# Patient Record
Sex: FEMALE | Race: WHITE | NOT HISPANIC OR LATINO | Employment: OTHER | URBAN - METROPOLITAN AREA
[De-identification: names, ages, dates, MRNs, and addresses within clinical notes are randomized per-mention and may not be internally consistent; named-entity substitution may affect disease eponyms.]

---

## 2019-11-26 ENCOUNTER — APPOINTMENT (OUTPATIENT)
Dept: GENERAL RADIOLOGY | Facility: HOSPITAL | Age: 84
End: 2019-11-26

## 2019-11-26 ENCOUNTER — APPOINTMENT (OUTPATIENT)
Dept: CT IMAGING | Facility: HOSPITAL | Age: 84
End: 2019-11-26

## 2019-11-26 ENCOUNTER — HOSPITAL ENCOUNTER (INPATIENT)
Facility: HOSPITAL | Age: 84
LOS: 4 days | Discharge: NURSING, REHAB OR LONG-TERM CARE | End: 2019-12-01
Attending: EMERGENCY MEDICINE | Admitting: INTERNAL MEDICINE

## 2019-11-26 ENCOUNTER — HOSPITAL ENCOUNTER (OUTPATIENT)
Dept: CARDIOLOGY | Facility: HOSPITAL | Age: 84
Discharge: HOME | End: 2019-11-26

## 2019-11-26 VITALS
BODY MASS INDEX: 23.19 KG/M2 | HEIGHT: 62 IN | SYSTOLIC BLOOD PRESSURE: 115 MMHG | WEIGHT: 126 LBS | DIASTOLIC BLOOD PRESSURE: 69 MMHG

## 2019-11-26 DIAGNOSIS — R77.8 ELEVATED TROPONIN: ICD-10-CM

## 2019-11-26 DIAGNOSIS — I95.9 HYPOTENSION, UNSPECIFIED HYPOTENSION TYPE: ICD-10-CM

## 2019-11-26 DIAGNOSIS — I48.91 NEW ONSET ATRIAL FIBRILLATION (HCC): Primary | ICD-10-CM

## 2019-11-26 LAB
ALBUMIN SERPL-MCNC: 3.6 G/DL (ref 3.5–5.2)
ALBUMIN/GLOB SERPL: 1.2 G/DL
ALP SERPL-CCNC: 46 U/L (ref 39–117)
ALT SERPL W P-5'-P-CCNC: 13 U/L (ref 1–33)
ANION GAP SERPL CALCULATED.3IONS-SCNC: 13 MMOL/L (ref 5–15)
ANION GAP SERPL CALCULATED.3IONS-SCNC: 17 MMOL/L (ref 5–15)
AST SERPL-CCNC: 28 U/L (ref 1–32)
BACTERIA UR QL AUTO: ABNORMAL /HPF
BASOPHILS # BLD AUTO: 0 10*3/MM3 (ref 0–0.2)
BASOPHILS NFR BLD AUTO: 0.5 % (ref 0–1.5)
BH CV ECHO MEAS - % IVS THICK: 31.2 %
BH CV ECHO MEAS - % LVPW THICK: 13.9 %
BH CV ECHO MEAS - ACS: 1.6 CM
BH CV ECHO MEAS - AI DEC SLOPE: 286.3 CM/SEC^2
BH CV ECHO MEAS - AI DEC TIME: 1.2 SEC
BH CV ECHO MEAS - AI MAX PG: 47.1 MMHG
BH CV ECHO MEAS - AI MAX VEL: 343.2 CM/SEC
BH CV ECHO MEAS - AI P1/2T: 351.1 MSEC
BH CV ECHO MEAS - AO MAX PG (FULL): 4.8 MMHG
BH CV ECHO MEAS - AO MAX PG: 9.1 MMHG
BH CV ECHO MEAS - AO MEAN PG (FULL): 3.3 MMHG
BH CV ECHO MEAS - AO MEAN PG: 5.6 MMHG
BH CV ECHO MEAS - AO ROOT AREA (BSA CORRECTED): 1.8
BH CV ECHO MEAS - AO ROOT AREA: 6.4 CM^2
BH CV ECHO MEAS - AO ROOT DIAM: 2.9 CM
BH CV ECHO MEAS - AO V2 MAX: 150.6 CM/SEC
BH CV ECHO MEAS - AO V2 MEAN: 112.4 CM/SEC
BH CV ECHO MEAS - AO V2 VTI: 26.1 CM
BH CV ECHO MEAS - ASC AORTA: 3.3 CM
BH CV ECHO MEAS - AVA(I,A): 1.8 CM^2
BH CV ECHO MEAS - AVA(I,D): 1.8 CM^2
BH CV ECHO MEAS - AVA(V,A): 2 CM^2
BH CV ECHO MEAS - AVA(V,D): 2 CM^2
BH CV ECHO MEAS - BSA(HAYCOCK): 1.6 M^2
BH CV ECHO MEAS - BSA: 1.6 M^2
BH CV ECHO MEAS - BZI_BMI: 23 KILOGRAMS/M^2
BH CV ECHO MEAS - BZI_METRIC_HEIGHT: 157.5 CM
BH CV ECHO MEAS - BZI_METRIC_WEIGHT: 57.2 KG
BH CV ECHO MEAS - EDV(CUBED): 44 ML
BH CV ECHO MEAS - EDV(MOD-SP2): 36 ML
BH CV ECHO MEAS - EDV(MOD-SP4): 37 ML
BH CV ECHO MEAS - EDV(TEICH): 51.9 ML
BH CV ECHO MEAS - EF(CUBED): 79.6 %
BH CV ECHO MEAS - EF(MOD-BP): 62 %
BH CV ECHO MEAS - EF(MOD-SP2): 60.2 %
BH CV ECHO MEAS - EF(MOD-SP4): 63.4 %
BH CV ECHO MEAS - EF(TEICH): 73 %
BH CV ECHO MEAS - ESV(CUBED): 9 ML
BH CV ECHO MEAS - ESV(MOD-SP2): 14.3 ML
BH CV ECHO MEAS - ESV(MOD-SP4): 13.6 ML
BH CV ECHO MEAS - ESV(TEICH): 14 ML
BH CV ECHO MEAS - FS: 41.1 %
BH CV ECHO MEAS - IVS/LVPW: 1
BH CV ECHO MEAS - IVSD: 0.99 CM
BH CV ECHO MEAS - IVSS: 1.3 CM
BH CV ECHO MEAS - LA DIMENSION(2D): 3.1 CM
BH CV ECHO MEAS - LV DIASTOLIC VOL/BSA (35-75): 23.6 ML/M^2
BH CV ECHO MEAS - LV MASS(C)D: 100.8 GRAMS
BH CV ECHO MEAS - LV MASS(C)DI: 64.2 GRAMS/M^2
BH CV ECHO MEAS - LV MASS(C)S: 67.4 GRAMS
BH CV ECHO MEAS - LV MASS(C)SI: 42.9 GRAMS/M^2
BH CV ECHO MEAS - LV MAX PG: 4.3 MMHG
BH CV ECHO MEAS - LV MEAN PG: 2.3 MMHG
BH CV ECHO MEAS - LV SYSTOLIC VOL/BSA (12-30): 8.6 ML/M^2
BH CV ECHO MEAS - LV V1 MAX: 103.4 CM/SEC
BH CV ECHO MEAS - LV V1 MEAN: 70.2 CM/SEC
BH CV ECHO MEAS - LV V1 VTI: 16.3 CM
BH CV ECHO MEAS - LVIDD: 3.5 CM
BH CV ECHO MEAS - LVIDS: 2.1 CM
BH CV ECHO MEAS - LVOT AREA: 2.9 CM^2
BH CV ECHO MEAS - LVOT DIAM: 1.9 CM
BH CV ECHO MEAS - LVPWD: 0.96 CM
BH CV ECHO MEAS - LVPWS: 1.1 CM
BH CV ECHO MEAS - MV A MAX VEL: 112.8 CM/SEC
BH CV ECHO MEAS - MV DEC SLOPE: 579.1 CM/SEC^2
BH CV ECHO MEAS - MV DEC TIME: 0.18 SEC
BH CV ECHO MEAS - MV E MAX VEL: 55.9 CM/SEC
BH CV ECHO MEAS - MV E/A: 0.5
BH CV ECHO MEAS - MV MAX PG: 7.4 MMHG
BH CV ECHO MEAS - MV MEAN PG: 4.4 MMHG
BH CV ECHO MEAS - MV V2 MAX: 136.3 CM/SEC
BH CV ECHO MEAS - MV V2 MEAN: 101.5 CM/SEC
BH CV ECHO MEAS - MV V2 VTI: 27.7 CM
BH CV ECHO MEAS - MVA(VTI): 1.7 CM^2
BH CV ECHO MEAS - PA ACC TIME: 0.06 SEC
BH CV ECHO MEAS - PA MAX PG (FULL): 0.36 MMHG
BH CV ECHO MEAS - PA MAX PG: 4.1 MMHG
BH CV ECHO MEAS - PA MEAN PG (FULL): 0.48 MMHG
BH CV ECHO MEAS - PA MEAN PG: 1.9 MMHG
BH CV ECHO MEAS - PA PR(ACCEL): 51 MMHG
BH CV ECHO MEAS - PA V2 MAX: 101 CM/SEC
BH CV ECHO MEAS - PA V2 MEAN: 64.2 CM/SEC
BH CV ECHO MEAS - PA V2 VTI: 16.7 CM
BH CV ECHO MEAS - PVA(I,A): 2.1 CM^2
BH CV ECHO MEAS - PVA(I,D): 2.1 CM^2
BH CV ECHO MEAS - PVA(V,A): 2.5 CM^2
BH CV ECHO MEAS - PVA(V,D): 2.5 CM^2
BH CV ECHO MEAS - QP/QS: 0.74
BH CV ECHO MEAS - RAP SYSTOLE: 8 MMHG
BH CV ECHO MEAS - RV MAX PG: 3.7 MMHG
BH CV ECHO MEAS - RV MEAN PG: 1.4 MMHG
BH CV ECHO MEAS - RV V1 MAX: 96.4 CM/SEC
BH CV ECHO MEAS - RV V1 MEAN: 54.6 CM/SEC
BH CV ECHO MEAS - RV V1 VTI: 13.8 CM
BH CV ECHO MEAS - RVDD: 2.3 CM
BH CV ECHO MEAS - RVOT AREA: 2.6 CM^2
BH CV ECHO MEAS - RVOT DIAM: 1.8 CM
BH CV ECHO MEAS - RVSP: 46.7 MMHG
BH CV ECHO MEAS - SI(AO): 106.8 ML/M^2
BH CV ECHO MEAS - SI(CUBED): 22.3 ML/M^2
BH CV ECHO MEAS - SI(LVOT): 30.6 ML/M^2
BH CV ECHO MEAS - SI(MOD-SP2): 13.8 ML/M^2
BH CV ECHO MEAS - SI(MOD-SP4): 15 ML/M^2
BH CV ECHO MEAS - SI(TEICH): 24.1 ML/M^2
BH CV ECHO MEAS - SV(AO): 167.8 ML
BH CV ECHO MEAS - SV(CUBED): 35 ML
BH CV ECHO MEAS - SV(LVOT): 48 ML
BH CV ECHO MEAS - SV(MOD-SP2): 21.7 ML
BH CV ECHO MEAS - SV(MOD-SP4): 23.5 ML
BH CV ECHO MEAS - SV(RVOT): 35.6 ML
BH CV ECHO MEAS - SV(TEICH): 37.9 ML
BH CV ECHO MEAS - TR MAX VEL: 310.9 CM/SEC
BILIRUB SERPL-MCNC: 0.6 MG/DL (ref 0.2–1.2)
BILIRUB UR QL STRIP: NEGATIVE
BUN BLD-MCNC: 19 MG/DL (ref 8–23)
BUN BLD-MCNC: 21 MG/DL (ref 8–23)
BUN/CREAT SERPL: 22.3 (ref 7–25)
BUN/CREAT SERPL: 25.3 (ref 7–25)
CALCIUM SPEC-SCNC: 8.1 MG/DL (ref 8.2–9.6)
CALCIUM SPEC-SCNC: 8.6 MG/DL (ref 8.2–9.6)
CHLORIDE SERPL-SCNC: 88 MMOL/L (ref 98–107)
CHLORIDE SERPL-SCNC: 94 MMOL/L (ref 98–107)
CK SERPL-CCNC: 198 U/L (ref 20–180)
CLARITY UR: CLEAR
CO2 SERPL-SCNC: 25 MMOL/L (ref 22–29)
CO2 SERPL-SCNC: 26 MMOL/L (ref 22–29)
COLOR UR: YELLOW
CREAT BLD-MCNC: 0.75 MG/DL (ref 0.57–1)
CREAT BLD-MCNC: 0.94 MG/DL (ref 0.57–1)
D-LACTATE SERPL-SCNC: 2.3 MMOL/L (ref 0.5–2)
D-LACTATE SERPL-SCNC: 3.2 MMOL/L (ref 0.5–2)
DEPRECATED RDW RBC AUTO: 44.6 FL (ref 37–54)
EOSINOPHIL # BLD AUTO: 0.1 10*3/MM3 (ref 0–0.4)
EOSINOPHIL NFR BLD AUTO: 0.6 % (ref 0.3–6.2)
ERYTHROCYTE [DISTWIDTH] IN BLOOD BY AUTOMATED COUNT: 13.8 % (ref 12.3–15.4)
GFR SERPL CREATININE-BSD FRML MDRD: 55 ML/MIN/1.73
GFR SERPL CREATININE-BSD FRML MDRD: 72 ML/MIN/1.73
GLOBULIN UR ELPH-MCNC: 3.1 GM/DL
GLUCOSE BLD-MCNC: 134 MG/DL (ref 65–99)
GLUCOSE BLD-MCNC: 191 MG/DL (ref 65–99)
GLUCOSE UR STRIP-MCNC: NEGATIVE MG/DL
HCT VFR BLD AUTO: 39.6 % (ref 34–46.6)
HGB BLD-MCNC: 13.8 G/DL (ref 12–15.9)
HGB UR QL STRIP.AUTO: ABNORMAL
HOLD SPECIMEN: NORMAL
HOLD SPECIMEN: NORMAL
HYALINE CASTS UR QL AUTO: ABNORMAL /LPF
KETONES UR QL STRIP: ABNORMAL
LEUKOCYTE ESTERASE UR QL STRIP.AUTO: NEGATIVE
LV EF 2D ECHO EST: 60 %
LYMPHOCYTES # BLD AUTO: 0.7 10*3/MM3 (ref 0.7–3.1)
LYMPHOCYTES NFR BLD AUTO: 6.3 % (ref 19.6–45.3)
MAXIMAL PREDICTED HEART RATE: 126 BPM
MCH RBC QN AUTO: 32.3 PG (ref 26.6–33)
MCHC RBC AUTO-ENTMCNC: 34.9 G/DL (ref 31.5–35.7)
MCV RBC AUTO: 92.4 FL (ref 79–97)
MONOCYTES # BLD AUTO: 0.8 10*3/MM3 (ref 0.1–0.9)
MONOCYTES NFR BLD AUTO: 7.6 % (ref 5–12)
NEUTROPHILS # BLD AUTO: 9 10*3/MM3 (ref 1.7–7)
NEUTROPHILS NFR BLD AUTO: 85 % (ref 42.7–76)
NITRITE UR QL STRIP: NEGATIVE
NRBC BLD AUTO-RTO: 0.1 /100 WBC (ref 0–0.2)
PH UR STRIP.AUTO: 7.5 [PH] (ref 5–8)
PLATELET # BLD AUTO: 318 10*3/MM3 (ref 140–450)
PMV BLD AUTO: 7.2 FL (ref 6–12)
POTASSIUM BLD-SCNC: 2.9 MMOL/L (ref 3.5–5.2)
POTASSIUM BLD-SCNC: 3.3 MMOL/L (ref 3.5–5.2)
PROT SERPL-MCNC: 6.7 G/DL (ref 6–8.5)
PROT UR QL STRIP: ABNORMAL
RBC # BLD AUTO: 4.28 10*6/MM3 (ref 3.77–5.28)
RBC # UR: ABNORMAL /HPF
REF LAB TEST METHOD: ABNORMAL
SODIUM BLD-SCNC: 130 MMOL/L (ref 136–145)
SODIUM BLD-SCNC: 133 MMOL/L (ref 136–145)
SP GR UR STRIP: 1.01 (ref 1–1.03)
SQUAMOUS #/AREA URNS HPF: ABNORMAL /HPF
STRESS TARGET HR: 107 BPM
TROPONIN T SERPL-MCNC: 0.04 NG/ML (ref 0–0.03)
TROPONIN T SERPL-MCNC: 0.77 NG/ML (ref 0–0.03)
TROPONIN T SERPL-MCNC: 0.84 NG/ML (ref 0–0.03)
TSH SERPL DL<=0.05 MIU/L-ACNC: 1.59 UIU/ML (ref 0.27–4.2)
UROBILINOGEN UR QL STRIP: ABNORMAL
WBC NRBC COR # BLD: 10.5 10*3/MM3 (ref 3.4–10.8)
WBC UR QL AUTO: ABNORMAL /HPF

## 2019-11-26 PROCEDURE — G0378 HOSPITAL OBSERVATION PER HR: HCPCS

## 2019-11-26 PROCEDURE — 82550 ASSAY OF CK (CPK): CPT | Performed by: EMERGENCY MEDICINE

## 2019-11-26 PROCEDURE — 25010000002 ENOXAPARIN PER 10 MG: Performed by: INTERNAL MEDICINE

## 2019-11-26 PROCEDURE — 84484 ASSAY OF TROPONIN QUANT: CPT | Performed by: EMERGENCY MEDICINE

## 2019-11-26 PROCEDURE — 99284 EMERGENCY DEPT VISIT MOD MDM: CPT

## 2019-11-26 PROCEDURE — 80053 COMPREHEN METABOLIC PANEL: CPT | Performed by: EMERGENCY MEDICINE

## 2019-11-26 PROCEDURE — 70450 CT HEAD/BRAIN W/O DYE: CPT

## 2019-11-26 PROCEDURE — 81001 URINALYSIS AUTO W/SCOPE: CPT | Performed by: EMERGENCY MEDICINE

## 2019-11-26 PROCEDURE — 93005 ELECTROCARDIOGRAM TRACING: CPT | Performed by: INTERNAL MEDICINE

## 2019-11-26 PROCEDURE — 72125 CT NECK SPINE W/O DYE: CPT

## 2019-11-26 PROCEDURE — 84443 ASSAY THYROID STIM HORMONE: CPT | Performed by: INTERNAL MEDICINE

## 2019-11-26 PROCEDURE — 93005 ELECTROCARDIOGRAM TRACING: CPT | Performed by: EMERGENCY MEDICINE

## 2019-11-26 PROCEDURE — P9612 CATHETERIZE FOR URINE SPEC: HCPCS

## 2019-11-26 PROCEDURE — 84484 ASSAY OF TROPONIN QUANT: CPT | Performed by: INTERNAL MEDICINE

## 2019-11-26 PROCEDURE — 83605 ASSAY OF LACTIC ACID: CPT

## 2019-11-26 PROCEDURE — 93306 TTE W/DOPPLER COMPLETE: CPT | Performed by: INTERNAL MEDICINE

## 2019-11-26 PROCEDURE — 71045 X-RAY EXAM CHEST 1 VIEW: CPT

## 2019-11-26 PROCEDURE — 93306 TTE W/DOPPLER COMPLETE: CPT

## 2019-11-26 PROCEDURE — 99203 OFFICE O/P NEW LOW 30 MIN: CPT | Performed by: INTERNAL MEDICINE

## 2019-11-26 PROCEDURE — 85025 COMPLETE CBC W/AUTO DIFF WBC: CPT | Performed by: EMERGENCY MEDICINE

## 2019-11-26 PROCEDURE — 87040 BLOOD CULTURE FOR BACTERIA: CPT | Performed by: EMERGENCY MEDICINE

## 2019-11-26 PROCEDURE — 92610 EVALUATE SWALLOWING FUNCTION: CPT

## 2019-11-26 RX ORDER — POTASSIUM CHLORIDE 20 MEQ/1
40 TABLET, EXTENDED RELEASE ORAL ONCE
Status: COMPLETED | OUTPATIENT
Start: 2019-11-26 | End: 2019-11-26

## 2019-11-26 RX ORDER — ONDANSETRON 2 MG/ML
4 INJECTION INTRAMUSCULAR; INTRAVENOUS EVERY 6 HOURS PRN
Status: DISCONTINUED | OUTPATIENT
Start: 2019-11-26 | End: 2019-12-01 | Stop reason: HOSPADM

## 2019-11-26 RX ORDER — METRONIDAZOLE 500 MG/1
500 TABLET ORAL 3 TIMES DAILY
COMMUNITY
Start: 2019-11-16 | End: 2019-12-01 | Stop reason: HOSPADM

## 2019-11-26 RX ORDER — ACETAMINOPHEN 325 MG/1
650 TABLET ORAL EVERY 4 HOURS PRN
Status: DISCONTINUED | OUTPATIENT
Start: 2019-11-26 | End: 2019-12-01 | Stop reason: HOSPADM

## 2019-11-26 RX ORDER — SODIUM CHLORIDE 0.9 % (FLUSH) 0.9 %
10 SYRINGE (ML) INJECTION EVERY 12 HOURS SCHEDULED
Status: DISCONTINUED | OUTPATIENT
Start: 2019-11-26 | End: 2019-12-01 | Stop reason: HOSPADM

## 2019-11-26 RX ORDER — POTASSIUM CHLORIDE 1.5 G/1.77G
40 POWDER, FOR SOLUTION ORAL AS NEEDED
Status: DISCONTINUED | OUTPATIENT
Start: 2019-11-26 | End: 2019-12-01 | Stop reason: HOSPADM

## 2019-11-26 RX ORDER — ONDANSETRON 4 MG/1
4 TABLET, FILM COATED ORAL EVERY 6 HOURS PRN
Status: DISCONTINUED | OUTPATIENT
Start: 2019-11-26 | End: 2019-12-01 | Stop reason: HOSPADM

## 2019-11-26 RX ORDER — ACETAMINOPHEN 160 MG/5ML
650 SOLUTION ORAL EVERY 4 HOURS PRN
Status: DISCONTINUED | OUTPATIENT
Start: 2019-11-26 | End: 2019-12-01 | Stop reason: HOSPADM

## 2019-11-26 RX ORDER — HYDROCHLOROTHIAZIDE 12.5 MG/1
12.5 TABLET ORAL DAILY
COMMUNITY
End: 2019-12-01 | Stop reason: HOSPADM

## 2019-11-26 RX ORDER — ASPIRIN 81 MG/1
81 TABLET, CHEWABLE ORAL DAILY
Status: DISCONTINUED | OUTPATIENT
Start: 2019-11-26 | End: 2019-12-01 | Stop reason: HOSPADM

## 2019-11-26 RX ORDER — VIT C/E/ZN/COPPR/LUTEIN/ZEAXAN 250MG-90MG
2 CAPSULE ORAL DAILY
COMMUNITY
End: 2019-12-01 | Stop reason: HOSPADM

## 2019-11-26 RX ORDER — ASPIRIN 81 MG/1
81 TABLET, CHEWABLE ORAL DAILY
COMMUNITY
End: 2022-09-09 | Stop reason: HOSPADM

## 2019-11-26 RX ORDER — MULTIPLE VITAMINS W/ MINERALS TAB 2148-113
1 TAB ORAL DAILY
COMMUNITY
End: 2019-12-01 | Stop reason: HOSPADM

## 2019-11-26 RX ORDER — ACETAMINOPHEN 650 MG/1
650 SUPPOSITORY RECTAL EVERY 4 HOURS PRN
Status: DISCONTINUED | OUTPATIENT
Start: 2019-11-26 | End: 2019-12-01 | Stop reason: HOSPADM

## 2019-11-26 RX ORDER — ERGOCALCIFEROL (VITAMIN D2) 10 MCG
400 TABLET ORAL DAILY
COMMUNITY
End: 2019-12-01 | Stop reason: HOSPADM

## 2019-11-26 RX ORDER — SODIUM CHLORIDE 0.9 % (FLUSH) 0.9 %
10 SYRINGE (ML) INJECTION AS NEEDED
Status: DISCONTINUED | OUTPATIENT
Start: 2019-11-26 | End: 2019-12-01 | Stop reason: HOSPADM

## 2019-11-26 RX ORDER — FAMOTIDINE 20 MG/1
40 TABLET, FILM COATED ORAL DAILY
Status: DISCONTINUED | OUTPATIENT
Start: 2019-11-26 | End: 2019-12-01 | Stop reason: HOSPADM

## 2019-11-26 RX ORDER — POTASSIUM CHLORIDE 20 MEQ/1
40 TABLET, EXTENDED RELEASE ORAL AS NEEDED
Status: DISCONTINUED | OUTPATIENT
Start: 2019-11-26 | End: 2019-12-01 | Stop reason: HOSPADM

## 2019-11-26 RX ORDER — LEVOFLOXACIN 500 MG/1
500 TABLET, FILM COATED ORAL DAILY
COMMUNITY
Start: 2019-11-16 | End: 2019-12-01 | Stop reason: HOSPADM

## 2019-11-26 RX ADMIN — METOPROLOL TARTRATE 12.5 MG: 25 TABLET ORAL at 20:49

## 2019-11-26 RX ADMIN — Medication 10 ML: at 09:07

## 2019-11-26 RX ADMIN — POTASSIUM CHLORIDE 40 MEQ: 1500 TABLET, EXTENDED RELEASE ORAL at 12:06

## 2019-11-26 RX ADMIN — ASPIRIN 81 MG 81 MG: 81 TABLET ORAL at 09:06

## 2019-11-26 RX ADMIN — SODIUM CHLORIDE 1974 ML: 900 INJECTION, SOLUTION INTRAVENOUS at 01:24

## 2019-11-26 RX ADMIN — ENOXAPARIN SODIUM 60 MG: 60 INJECTION SUBCUTANEOUS at 11:54

## 2019-11-26 RX ADMIN — POTASSIUM CHLORIDE 40 MEQ: 1500 TABLET, EXTENDED RELEASE ORAL at 01:29

## 2019-11-26 RX ADMIN — NITROGLYCERIN 1 INCH: 20 OINTMENT TOPICAL at 11:54

## 2019-11-26 RX ADMIN — Medication 10 ML: at 21:29

## 2019-11-26 RX ADMIN — ACETAMINOPHEN 650 MG: 325 TABLET ORAL at 20:48

## 2019-11-26 RX ADMIN — SODIUM CHLORIDE 1000 ML: 0.9 INJECTION, SOLUTION INTRAVENOUS at 01:05

## 2019-11-26 RX ADMIN — FAMOTIDINE 40 MG: 20 TABLET, FILM COATED ORAL at 09:06

## 2019-11-27 LAB
ANION GAP SERPL CALCULATED.3IONS-SCNC: 8 MMOL/L (ref 5–15)
BASOPHILS # BLD AUTO: 0.1 10*3/MM3 (ref 0–0.2)
BASOPHILS NFR BLD AUTO: 0.8 % (ref 0–1.5)
BUN BLD-MCNC: 22 MG/DL (ref 8–23)
BUN/CREAT SERPL: 32.4 (ref 7–25)
CALCIUM SPEC-SCNC: 8.3 MG/DL (ref 8.2–9.6)
CHLORIDE SERPL-SCNC: 96 MMOL/L (ref 98–107)
CO2 SERPL-SCNC: 28 MMOL/L (ref 22–29)
CREAT BLD-MCNC: 0.68 MG/DL (ref 0.57–1)
DEPRECATED RDW RBC AUTO: 46.4 FL (ref 37–54)
EOSINOPHIL # BLD AUTO: 0.7 10*3/MM3 (ref 0–0.4)
EOSINOPHIL NFR BLD AUTO: 8.4 % (ref 0.3–6.2)
ERYTHROCYTE [DISTWIDTH] IN BLOOD BY AUTOMATED COUNT: 14.5 % (ref 12.3–15.4)
GFR SERPL CREATININE-BSD FRML MDRD: 81 ML/MIN/1.73
GLUCOSE BLD-MCNC: 107 MG/DL (ref 65–99)
HCT VFR BLD AUTO: 31.8 % (ref 34–46.6)
HGB BLD-MCNC: 11 G/DL (ref 12–15.9)
LYMPHOCYTES # BLD AUTO: 1.3 10*3/MM3 (ref 0.7–3.1)
LYMPHOCYTES NFR BLD AUTO: 16.3 % (ref 19.6–45.3)
MAGNESIUM SERPL-MCNC: 2 MG/DL (ref 1.7–2.3)
MCH RBC QN AUTO: 32.1 PG (ref 26.6–33)
MCHC RBC AUTO-ENTMCNC: 34.5 G/DL (ref 31.5–35.7)
MCV RBC AUTO: 92.9 FL (ref 79–97)
MONOCYTES # BLD AUTO: 1.2 10*3/MM3 (ref 0.1–0.9)
MONOCYTES NFR BLD AUTO: 15.1 % (ref 5–12)
NEUTROPHILS # BLD AUTO: 4.8 10*3/MM3 (ref 1.7–7)
NEUTROPHILS NFR BLD AUTO: 59.4 % (ref 42.7–76)
NRBC BLD AUTO-RTO: 0 /100 WBC (ref 0–0.2)
PLATELET # BLD AUTO: 288 10*3/MM3 (ref 140–450)
PMV BLD AUTO: 7.5 FL (ref 6–12)
POTASSIUM BLD-SCNC: 3.5 MMOL/L (ref 3.5–5.2)
RBC # BLD AUTO: 3.42 10*6/MM3 (ref 3.77–5.28)
SODIUM BLD-SCNC: 132 MMOL/L (ref 136–145)
TROPONIN T SERPL-MCNC: 0.74 NG/ML (ref 0–0.03)
WBC NRBC COR # BLD: 8.2 10*3/MM3 (ref 3.4–10.8)

## 2019-11-27 PROCEDURE — 84484 ASSAY OF TROPONIN QUANT: CPT | Performed by: INTERNAL MEDICINE

## 2019-11-27 PROCEDURE — 85025 COMPLETE CBC W/AUTO DIFF WBC: CPT | Performed by: INTERNAL MEDICINE

## 2019-11-27 PROCEDURE — 97116 GAIT TRAINING THERAPY: CPT

## 2019-11-27 PROCEDURE — 25010000002 ENOXAPARIN PER 10 MG: Performed by: INTERNAL MEDICINE

## 2019-11-27 PROCEDURE — 97530 THERAPEUTIC ACTIVITIES: CPT

## 2019-11-27 PROCEDURE — 83735 ASSAY OF MAGNESIUM: CPT | Performed by: INTERNAL MEDICINE

## 2019-11-27 PROCEDURE — 97166 OT EVAL MOD COMPLEX 45 MIN: CPT

## 2019-11-27 PROCEDURE — 99232 SBSQ HOSP IP/OBS MODERATE 35: CPT | Performed by: INTERNAL MEDICINE

## 2019-11-27 PROCEDURE — 80048 BASIC METABOLIC PNL TOTAL CA: CPT | Performed by: INTERNAL MEDICINE

## 2019-11-27 PROCEDURE — 92526 ORAL FUNCTION THERAPY: CPT

## 2019-11-27 PROCEDURE — 97162 PT EVAL MOD COMPLEX 30 MIN: CPT

## 2019-11-27 RX ORDER — ATORVASTATIN CALCIUM 10 MG/1
10 TABLET, FILM COATED ORAL NIGHTLY
Status: DISCONTINUED | OUTPATIENT
Start: 2019-11-27 | End: 2019-12-01 | Stop reason: HOSPADM

## 2019-11-27 RX ADMIN — Medication 10 ML: at 22:11

## 2019-11-27 RX ADMIN — METOPROLOL TARTRATE 12.5 MG: 25 TABLET ORAL at 09:19

## 2019-11-27 RX ADMIN — ENOXAPARIN SODIUM 60 MG: 60 INJECTION SUBCUTANEOUS at 12:00

## 2019-11-27 RX ADMIN — FAMOTIDINE 40 MG: 20 TABLET, FILM COATED ORAL at 09:19

## 2019-11-27 RX ADMIN — ASPIRIN 81 MG 81 MG: 81 TABLET ORAL at 09:19

## 2019-11-27 RX ADMIN — Medication 10 ML: at 09:21

## 2019-11-27 RX ADMIN — ATORVASTATIN CALCIUM 10 MG: 10 TABLET, FILM COATED ORAL at 22:09

## 2019-11-27 RX ADMIN — ENOXAPARIN SODIUM 60 MG: 60 INJECTION SUBCUTANEOUS at 00:44

## 2019-11-27 RX ADMIN — METOPROLOL TARTRATE 12.5 MG: 25 TABLET ORAL at 22:09

## 2019-11-28 LAB
ANION GAP SERPL CALCULATED.3IONS-SCNC: 10 MMOL/L (ref 5–15)
BUN BLD-MCNC: 19 MG/DL (ref 8–23)
BUN/CREAT SERPL: 33.3 (ref 7–25)
CALCIUM SPEC-SCNC: 8.8 MG/DL (ref 8.2–9.6)
CHLORIDE SERPL-SCNC: 95 MMOL/L (ref 98–107)
CO2 SERPL-SCNC: 29 MMOL/L (ref 22–29)
CREAT BLD-MCNC: 0.57 MG/DL (ref 0.57–1)
GFR SERPL CREATININE-BSD FRML MDRD: 99 ML/MIN/1.73
GLUCOSE BLD-MCNC: 101 MG/DL (ref 65–99)
POTASSIUM BLD-SCNC: 4.2 MMOL/L (ref 3.5–5.2)
SODIUM BLD-SCNC: 134 MMOL/L (ref 136–145)

## 2019-11-28 PROCEDURE — 80048 BASIC METABOLIC PNL TOTAL CA: CPT | Performed by: INTERNAL MEDICINE

## 2019-11-28 PROCEDURE — 25010000002 ENOXAPARIN PER 10 MG: Performed by: INTERNAL MEDICINE

## 2019-11-28 PROCEDURE — 99232 SBSQ HOSP IP/OBS MODERATE 35: CPT | Performed by: INTERNAL MEDICINE

## 2019-11-28 RX ADMIN — ASPIRIN 81 MG 81 MG: 81 TABLET ORAL at 08:12

## 2019-11-28 RX ADMIN — METOPROLOL TARTRATE 12.5 MG: 25 TABLET ORAL at 22:02

## 2019-11-28 RX ADMIN — Medication 10 ML: at 22:03

## 2019-11-28 RX ADMIN — Medication 10 ML: at 08:12

## 2019-11-28 RX ADMIN — METOPROLOL TARTRATE 12.5 MG: 25 TABLET ORAL at 08:12

## 2019-11-28 RX ADMIN — ATORVASTATIN CALCIUM 10 MG: 10 TABLET, FILM COATED ORAL at 22:03

## 2019-11-28 RX ADMIN — FAMOTIDINE 40 MG: 20 TABLET, FILM COATED ORAL at 08:12

## 2019-11-28 RX ADMIN — ENOXAPARIN SODIUM 40 MG: 40 INJECTION SUBCUTANEOUS at 17:23

## 2019-11-29 LAB
ANION GAP SERPL CALCULATED.3IONS-SCNC: 8 MMOL/L (ref 5–15)
BUN BLD-MCNC: 14 MG/DL (ref 8–23)
BUN/CREAT SERPL: 24.1 (ref 7–25)
CALCIUM SPEC-SCNC: 9 MG/DL (ref 8.2–9.6)
CHLORIDE SERPL-SCNC: 95 MMOL/L (ref 98–107)
CO2 SERPL-SCNC: 32 MMOL/L (ref 22–29)
CREAT BLD-MCNC: 0.58 MG/DL (ref 0.57–1)
GFR SERPL CREATININE-BSD FRML MDRD: 97 ML/MIN/1.73
GLUCOSE BLD-MCNC: 89 MG/DL (ref 65–99)
POTASSIUM BLD-SCNC: 3.6 MMOL/L (ref 3.5–5.2)
SODIUM BLD-SCNC: 135 MMOL/L (ref 136–145)

## 2019-11-29 PROCEDURE — 97116 GAIT TRAINING THERAPY: CPT

## 2019-11-29 PROCEDURE — 97530 THERAPEUTIC ACTIVITIES: CPT

## 2019-11-29 PROCEDURE — 80048 BASIC METABOLIC PNL TOTAL CA: CPT | Performed by: INTERNAL MEDICINE

## 2019-11-29 PROCEDURE — 97535 SELF CARE MNGMENT TRAINING: CPT

## 2019-11-29 PROCEDURE — 97110 THERAPEUTIC EXERCISES: CPT

## 2019-11-29 PROCEDURE — 93010 ELECTROCARDIOGRAM REPORT: CPT | Performed by: INTERNAL MEDICINE

## 2019-11-29 PROCEDURE — 25010000002 ENOXAPARIN PER 10 MG: Performed by: INTERNAL MEDICINE

## 2019-11-29 PROCEDURE — 99232 SBSQ HOSP IP/OBS MODERATE 35: CPT | Performed by: INTERNAL MEDICINE

## 2019-11-29 PROCEDURE — 97112 NEUROMUSCULAR REEDUCATION: CPT

## 2019-11-29 RX ORDER — ATORVASTATIN CALCIUM 10 MG/1
10 TABLET, FILM COATED ORAL NIGHTLY
Qty: 30 TABLET | Refills: 3 | Status: SHIPPED | OUTPATIENT
Start: 2019-11-29

## 2019-11-29 RX ADMIN — ASPIRIN 81 MG 81 MG: 81 TABLET ORAL at 09:41

## 2019-11-29 RX ADMIN — Medication 10 ML: at 10:11

## 2019-11-29 RX ADMIN — Medication 10 ML: at 20:47

## 2019-11-29 RX ADMIN — METOPROLOL TARTRATE 12.5 MG: 25 TABLET ORAL at 09:41

## 2019-11-29 RX ADMIN — METOPROLOL TARTRATE 12.5 MG: 25 TABLET ORAL at 20:47

## 2019-11-29 RX ADMIN — ENOXAPARIN SODIUM 40 MG: 40 INJECTION SUBCUTANEOUS at 17:00

## 2019-11-29 RX ADMIN — FAMOTIDINE 40 MG: 20 TABLET, FILM COATED ORAL at 09:41

## 2019-11-29 RX ADMIN — ATORVASTATIN CALCIUM 10 MG: 10 TABLET, FILM COATED ORAL at 20:47

## 2019-11-30 LAB
ANION GAP SERPL CALCULATED.3IONS-SCNC: 11 MMOL/L (ref 5–15)
BUN BLD-MCNC: 13 MG/DL (ref 8–23)
BUN/CREAT SERPL: 24.5 (ref 7–25)
CALCIUM SPEC-SCNC: 8.6 MG/DL (ref 8.2–9.6)
CHLORIDE SERPL-SCNC: 96 MMOL/L (ref 98–107)
CO2 SERPL-SCNC: 30 MMOL/L (ref 22–29)
CREAT BLD-MCNC: 0.53 MG/DL (ref 0.57–1)
GFR SERPL CREATININE-BSD FRML MDRD: 107 ML/MIN/1.73
GLUCOSE BLD-MCNC: 95 MG/DL (ref 65–99)
POTASSIUM BLD-SCNC: 3.3 MMOL/L (ref 3.5–5.2)
POTASSIUM BLD-SCNC: 4.8 MMOL/L (ref 3.5–5.2)
SODIUM BLD-SCNC: 137 MMOL/L (ref 136–145)

## 2019-11-30 PROCEDURE — 99232 SBSQ HOSP IP/OBS MODERATE 35: CPT | Performed by: INTERNAL MEDICINE

## 2019-11-30 PROCEDURE — 84132 ASSAY OF SERUM POTASSIUM: CPT | Performed by: INTERNAL MEDICINE

## 2019-11-30 PROCEDURE — 80048 BASIC METABOLIC PNL TOTAL CA: CPT | Performed by: INTERNAL MEDICINE

## 2019-11-30 PROCEDURE — 25010000002 ENOXAPARIN PER 10 MG: Performed by: INTERNAL MEDICINE

## 2019-11-30 RX ADMIN — ASPIRIN 81 MG 81 MG: 81 TABLET ORAL at 08:33

## 2019-11-30 RX ADMIN — FAMOTIDINE 40 MG: 20 TABLET, FILM COATED ORAL at 08:33

## 2019-11-30 RX ADMIN — ATORVASTATIN CALCIUM 10 MG: 10 TABLET, FILM COATED ORAL at 21:49

## 2019-11-30 RX ADMIN — METOPROLOL TARTRATE 12.5 MG: 25 TABLET ORAL at 08:32

## 2019-11-30 RX ADMIN — Medication 10 ML: at 08:32

## 2019-11-30 RX ADMIN — Medication 10 ML: at 21:50

## 2019-11-30 RX ADMIN — ENOXAPARIN SODIUM 40 MG: 40 INJECTION SUBCUTANEOUS at 15:27

## 2019-11-30 RX ADMIN — POTASSIUM CHLORIDE 40 MEQ: 1500 TABLET, EXTENDED RELEASE ORAL at 04:48

## 2019-11-30 RX ADMIN — METOPROLOL TARTRATE 12.5 MG: 25 TABLET ORAL at 21:50

## 2019-11-30 RX ADMIN — POTASSIUM CHLORIDE 40 MEQ: 1500 TABLET, EXTENDED RELEASE ORAL at 08:32

## 2019-12-01 VITALS
TEMPERATURE: 97.4 F | WEIGHT: 123.9 LBS | RESPIRATION RATE: 18 BRPM | HEART RATE: 67 BPM | DIASTOLIC BLOOD PRESSURE: 81 MMHG | HEIGHT: 62 IN | SYSTOLIC BLOOD PRESSURE: 142 MMHG | OXYGEN SATURATION: 95 % | BODY MASS INDEX: 22.8 KG/M2

## 2019-12-01 LAB
ANION GAP SERPL CALCULATED.3IONS-SCNC: 12 MMOL/L (ref 5–15)
BACTERIA SPEC AEROBE CULT: NORMAL
BACTERIA SPEC AEROBE CULT: NORMAL
BUN BLD-MCNC: 10 MG/DL (ref 8–23)
BUN/CREAT SERPL: 16.7 (ref 7–25)
CALCIUM SPEC-SCNC: 8.9 MG/DL (ref 8.2–9.6)
CHLORIDE SERPL-SCNC: 94 MMOL/L (ref 98–107)
CO2 SERPL-SCNC: 26 MMOL/L (ref 22–29)
CREAT BLD-MCNC: 0.6 MG/DL (ref 0.57–1)
GFR SERPL CREATININE-BSD FRML MDRD: 93 ML/MIN/1.73
GLUCOSE BLD-MCNC: 98 MG/DL (ref 65–99)
POTASSIUM BLD-SCNC: 3.9 MMOL/L (ref 3.5–5.2)
SODIUM BLD-SCNC: 132 MMOL/L (ref 136–145)

## 2019-12-01 PROCEDURE — 99232 SBSQ HOSP IP/OBS MODERATE 35: CPT | Performed by: INTERNAL MEDICINE

## 2019-12-01 PROCEDURE — 97110 THERAPEUTIC EXERCISES: CPT

## 2019-12-01 PROCEDURE — 97530 THERAPEUTIC ACTIVITIES: CPT

## 2019-12-01 PROCEDURE — 80048 BASIC METABOLIC PNL TOTAL CA: CPT | Performed by: INTERNAL MEDICINE

## 2019-12-01 RX ADMIN — METOPROLOL TARTRATE 12.5 MG: 25 TABLET ORAL at 09:34

## 2019-12-01 RX ADMIN — FAMOTIDINE 40 MG: 20 TABLET, FILM COATED ORAL at 09:35

## 2019-12-01 RX ADMIN — Medication 10 ML: at 09:34

## 2019-12-01 RX ADMIN — ASPIRIN 81 MG 81 MG: 81 TABLET ORAL at 09:35

## 2019-12-19 ENCOUNTER — OFFICE VISIT (OUTPATIENT)
Dept: CARDIOLOGY | Facility: CLINIC | Age: 84
End: 2019-12-19

## 2019-12-19 VITALS
SYSTOLIC BLOOD PRESSURE: 147 MMHG | BODY MASS INDEX: 21.95 KG/M2 | WEIGHT: 120 LBS | HEART RATE: 83 BPM | DIASTOLIC BLOOD PRESSURE: 79 MMHG | OXYGEN SATURATION: 95 %

## 2019-12-19 DIAGNOSIS — I48.91 NEW ONSET ATRIAL FIBRILLATION (HCC): Primary | ICD-10-CM

## 2019-12-19 DIAGNOSIS — I10 ESSENTIAL HYPERTENSION: ICD-10-CM

## 2019-12-19 DIAGNOSIS — E78.00 PURE HYPERCHOLESTEROLEMIA: ICD-10-CM

## 2019-12-19 PROCEDURE — 99213 OFFICE O/P EST LOW 20 MIN: CPT | Performed by: INTERNAL MEDICINE

## 2019-12-19 NOTE — PROGRESS NOTES
Subjective:     Encounter Date:12/19/2019      Patient ID: Sierra Arenas is a 89 y.o. female.    Chief Complaint:  History of Present Illness 89-year-old white female with history of recent diagnosed atrial fibrillation hypertension presents to my office for follow-up.  Patient is currently stable without any symptoms of chest pain or shortness of breath.  She uses a walker and a wheelchair all the time.  She is currently in a nursing home.  No complaint of any PND orthopnea.  No palpitations dizziness syncope or swelling of the feet.  Patient is taking the medicines regularly.  She is not on anticoagulation because the risk of fall and bleeding.    The following portions of the patient's history were reviewed and updated as appropriate: allergies, current medications, past family history, past medical history, past social history, past surgical history and problem list.  Past Medical History:   Diagnosis Date   • Atrial fibrillation (CMS/HCC)    • Hypertension      History reviewed. No pertinent surgical history.  /79   Pulse 83   Wt 54.4 kg (120 lb)   SpO2 95%   BMI 21.95 kg/m²   History reviewed. No pertinent family history.    Current Outpatient Medications:   •  aspirin 81 MG chewable tablet, Chew 81 mg Daily., Disp: , Rfl:   •  atorvastatin (LIPITOR) 10 MG tablet, Take 1 tablet by mouth Every Night., Disp: 30 tablet, Rfl: 3  •  metoprolol tartrate (LOPRESSOR) 25 MG tablet, Take 0.5 tablets by mouth Every 12 (Twelve) Hours for 30 days., Disp: 30 tablet, Rfl: 3  Allergies   Allergen Reactions   • Penicillins Unknown - Low Severity     Social History     Socioeconomic History   • Marital status:      Spouse name: Not on file   • Number of children: Not on file   • Years of education: Not on file   • Highest education level: Not on file   Tobacco Use   • Smoking status: Never Smoker   • Smokeless tobacco: Never Used     Review of Systems   Constitution: Negative for fever and malaise/fatigue.    Cardiovascular: Negative for chest pain, dyspnea on exertion and palpitations.   Respiratory: Negative for cough and shortness of breath.    Skin: Negative for rash.   Gastrointestinal: Negative for abdominal pain, nausea and vomiting.   Neurological: Negative for focal weakness and headaches.   All other systems reviewed and are negative.             Objective:     Physical Exam   Constitutional: She appears well-developed and well-nourished.   HENT:   Head: Normocephalic and atraumatic.   Eyes: Conjunctivae are normal. No scleral icterus.   Neck: Normal range of motion. Neck supple. No JVD present. Carotid bruit is not present.   Cardiovascular: Normal rate, regular rhythm, S1 normal, S2 normal and intact distal pulses. PMI is not displaced.   Murmur heard.  Pulmonary/Chest: Effort normal and breath sounds normal. She has no wheezes. She has no rales.   Abdominal: Soft. Bowel sounds are normal.   Neurological: She is alert. She has normal strength.   Skin: Skin is warm and dry. No rash noted.     Procedures    Lab Review:       Assessment:          Diagnosis Plan   1. New onset atrial fibrillation (CMS/HCC)     2. Essential hypertension     3. Pure hypercholesterolemia            Plan:       Patient has new onset atrial fibrillation is currently in sinus rhythm with metoprolol and aspirin  Patient is not on any anticoagulation because the risk of fall and bleeding  Patient's blood pressure and heart rate are stable  Patient's lipid levels are followed by the primary care doctor and she is on a statin

## 2020-04-13 ENCOUNTER — LAB REQUISITION (OUTPATIENT)
Dept: LAB | Facility: HOSPITAL | Age: 85
End: 2020-04-13

## 2020-04-13 DIAGNOSIS — Z00.00 ROUTINE GENERAL MEDICAL EXAMINATION AT A HEALTH CARE FACILITY: ICD-10-CM

## 2020-04-13 LAB
ANION GAP SERPL CALCULATED.3IONS-SCNC: 12.8 MMOL/L (ref 5–15)
BASOPHILS # BLD AUTO: 0.05 10*3/MM3 (ref 0–0.2)
BASOPHILS NFR BLD AUTO: 0.7 % (ref 0–1.5)
BUN BLD-MCNC: 20 MG/DL (ref 8–23)
BUN/CREAT SERPL: 30.3 (ref 7–25)
CALCIUM SPEC-SCNC: 8.9 MG/DL (ref 8.2–9.6)
CHLORIDE SERPL-SCNC: 90 MMOL/L (ref 98–107)
CO2 SERPL-SCNC: 26.2 MMOL/L (ref 22–29)
CREAT BLD-MCNC: 0.66 MG/DL (ref 0.57–1)
DEPRECATED RDW RBC AUTO: 42.8 FL (ref 37–54)
EOSINOPHIL # BLD AUTO: 0.45 10*3/MM3 (ref 0–0.4)
EOSINOPHIL NFR BLD AUTO: 6.3 % (ref 0.3–6.2)
ERYTHROCYTE [DISTWIDTH] IN BLOOD BY AUTOMATED COUNT: 12.6 % (ref 12.3–15.4)
GFR SERPL CREATININE-BSD FRML MDRD: 83 ML/MIN/1.73
GLUCOSE BLD-MCNC: 75 MG/DL (ref 65–99)
HCT VFR BLD AUTO: 37.2 % (ref 34–46.6)
HGB BLD-MCNC: 12.9 G/DL (ref 12–15.9)
IMM GRANULOCYTES # BLD AUTO: 0.03 10*3/MM3 (ref 0–0.05)
IMM GRANULOCYTES NFR BLD AUTO: 0.4 % (ref 0–0.5)
LYMPHOCYTES # BLD AUTO: 2.44 10*3/MM3 (ref 0.7–3.1)
LYMPHOCYTES NFR BLD AUTO: 34.2 % (ref 19.6–45.3)
MCH RBC QN AUTO: 32.3 PG (ref 26.6–33)
MCHC RBC AUTO-ENTMCNC: 34.7 G/DL (ref 31.5–35.7)
MCV RBC AUTO: 93 FL (ref 79–97)
MONOCYTES # BLD AUTO: 1.01 10*3/MM3 (ref 0.1–0.9)
MONOCYTES NFR BLD AUTO: 14.1 % (ref 5–12)
NEUTROPHILS # BLD AUTO: 3.16 10*3/MM3 (ref 1.7–7)
NEUTROPHILS NFR BLD AUTO: 44.3 % (ref 42.7–76)
NRBC BLD AUTO-RTO: 0 /100 WBC (ref 0–0.2)
PLATELET # BLD AUTO: 338 10*3/MM3 (ref 140–450)
PMV BLD AUTO: 10.1 FL (ref 6–12)
POTASSIUM BLD-SCNC: 5 MMOL/L (ref 3.5–5.2)
RBC # BLD AUTO: 4 10*6/MM3 (ref 3.77–5.28)
SODIUM BLD-SCNC: 129 MMOL/L (ref 136–145)
WBC NRBC COR # BLD: 7.14 10*3/MM3 (ref 3.4–10.8)

## 2020-04-13 PROCEDURE — 80048 BASIC METABOLIC PNL TOTAL CA: CPT

## 2020-04-13 PROCEDURE — 85025 COMPLETE CBC W/AUTO DIFF WBC: CPT

## 2021-08-22 ENCOUNTER — HOSPITAL ENCOUNTER (INPATIENT)
Facility: HOSPITAL | Age: 86
LOS: 2 days | Discharge: NURSING, REHAB OR LONG-TERM CARE | End: 2021-08-25
Attending: INTERNAL MEDICINE | Admitting: INTERNAL MEDICINE

## 2021-08-22 ENCOUNTER — APPOINTMENT (OUTPATIENT)
Dept: CT IMAGING | Facility: HOSPITAL | Age: 86
End: 2021-08-22

## 2021-08-22 ENCOUNTER — APPOINTMENT (OUTPATIENT)
Dept: GENERAL RADIOLOGY | Facility: HOSPITAL | Age: 86
End: 2021-08-22

## 2021-08-22 DIAGNOSIS — R53.1 GENERALIZED WEAKNESS: ICD-10-CM

## 2021-08-22 DIAGNOSIS — N39.0 ACUTE UTI: Primary | ICD-10-CM

## 2021-08-22 DIAGNOSIS — G93.41 METABOLIC ENCEPHALOPATHY: ICD-10-CM

## 2021-08-22 LAB
ALBUMIN SERPL-MCNC: 3.7 G/DL (ref 3.5–5.2)
ALBUMIN/GLOB SERPL: 1.1 G/DL
ALP SERPL-CCNC: 74 U/L (ref 39–117)
ALT SERPL W P-5'-P-CCNC: 8 U/L (ref 1–33)
AMMONIA BLD-SCNC: 14 UMOL/L (ref 11–51)
ANION GAP SERPL CALCULATED.3IONS-SCNC: 8 MMOL/L (ref 5–15)
AST SERPL-CCNC: 19 U/L (ref 1–32)
BACTERIA UR QL AUTO: ABNORMAL /HPF
BASOPHILS # BLD AUTO: 0.1 10*3/MM3 (ref 0–0.2)
BASOPHILS NFR BLD AUTO: 0.9 % (ref 0–1.5)
BILIRUB SERPL-MCNC: 0.8 MG/DL (ref 0–1.2)
BILIRUB UR QL STRIP: NEGATIVE
BUN SERPL-MCNC: 27 MG/DL (ref 8–23)
BUN/CREAT SERPL: 45.8 (ref 7–25)
C TRACH RRNA CVX QL NAA+PROBE: NOT DETECTED
CALCIUM SPEC-SCNC: 9 MG/DL (ref 8.2–9.6)
CHLORIDE SERPL-SCNC: 102 MMOL/L (ref 98–107)
CK SERPL-CCNC: 19 U/L (ref 20–180)
CLARITY UR: ABNORMAL
CLUE CELLS SPEC QL WET PREP: ABNORMAL
CO2 SERPL-SCNC: 30 MMOL/L (ref 22–29)
COLOR UR: ABNORMAL
CREAT SERPL-MCNC: 0.59 MG/DL (ref 0.57–1)
D-LACTATE SERPL-SCNC: 0.6 MMOL/L (ref 0.5–2)
DEPRECATED RDW RBC AUTO: 45.5 FL (ref 37–54)
EOSINOPHIL # BLD AUTO: 0.6 10*3/MM3 (ref 0–0.4)
EOSINOPHIL NFR BLD AUTO: 9.8 % (ref 0.3–6.2)
ERYTHROCYTE [DISTWIDTH] IN BLOOD BY AUTOMATED COUNT: 13.7 % (ref 12.3–15.4)
GFR SERPL CREATININE-BSD FRML MDRD: 95 ML/MIN/1.73
GLOBULIN UR ELPH-MCNC: 3.3 GM/DL
GLUCOSE BLDC GLUCOMTR-MCNC: 88 MG/DL (ref 70–105)
GLUCOSE SERPL-MCNC: 88 MG/DL (ref 65–99)
GLUCOSE UR STRIP-MCNC: NEGATIVE MG/DL
HCT VFR BLD AUTO: 35.3 % (ref 34–46.6)
HGB BLD-MCNC: 11.9 G/DL (ref 12–15.9)
HGB UR QL STRIP.AUTO: NEGATIVE
HOLD SPECIMEN: NORMAL
HOLD SPECIMEN: NORMAL
HYALINE CASTS UR QL AUTO: ABNORMAL /LPF
HYDATID CYST SPEC WET PREP: ABNORMAL
KETONES UR QL STRIP: NEGATIVE
LEUKOCYTE ESTERASE UR QL STRIP.AUTO: ABNORMAL
LIPASE SERPL-CCNC: 20 U/L (ref 13–60)
LYMPHOCYTES # BLD AUTO: 1.4 10*3/MM3 (ref 0.7–3.1)
LYMPHOCYTES NFR BLD AUTO: 24.3 % (ref 19.6–45.3)
MAGNESIUM SERPL-MCNC: 2 MG/DL (ref 1.7–2.3)
MCH RBC QN AUTO: 32.4 PG (ref 26.6–33)
MCHC RBC AUTO-ENTMCNC: 33.7 G/DL (ref 31.5–35.7)
MCV RBC AUTO: 96.2 FL (ref 79–97)
MONOCYTES # BLD AUTO: 0.7 10*3/MM3 (ref 0.1–0.9)
MONOCYTES NFR BLD AUTO: 11.3 % (ref 5–12)
N GONORRHOEA RRNA SPEC QL NAA+PROBE: NOT DETECTED
NEUTROPHILS NFR BLD AUTO: 3.2 10*3/MM3 (ref 1.7–7)
NEUTROPHILS NFR BLD AUTO: 53.7 % (ref 42.7–76)
NITRITE UR QL STRIP: NEGATIVE
NRBC BLD AUTO-RTO: 0 /100 WBC (ref 0–0.2)
NT-PROBNP SERPL-MCNC: 316.2 PG/ML (ref 0–1800)
PH UR STRIP.AUTO: <=5 [PH] (ref 5–8)
PLATELET # BLD AUTO: 334 10*3/MM3 (ref 140–450)
PMV BLD AUTO: 7.7 FL (ref 6–12)
POTASSIUM SERPL-SCNC: 4 MMOL/L (ref 3.5–5.2)
PROT SERPL-MCNC: 7 G/DL (ref 6–8.5)
PROT UR QL STRIP: NEGATIVE
RBC # BLD AUTO: 3.67 10*6/MM3 (ref 3.77–5.28)
RBC # UR: ABNORMAL /HPF
REF LAB TEST METHOD: ABNORMAL
SARS-COV-2 RNA PNL SPEC NAA+PROBE: NOT DETECTED
SODIUM SERPL-SCNC: 140 MMOL/L (ref 136–145)
SP GR UR STRIP: 1.02 (ref 1–1.03)
SQUAMOUS #/AREA URNS HPF: ABNORMAL /HPF
T VAGINALIS SPEC QL WET PREP: ABNORMAL
TROPONIN T SERPL-MCNC: <0.01 NG/ML (ref 0–0.03)
TSH SERPL DL<=0.05 MIU/L-ACNC: 3.03 UIU/ML (ref 0.27–4.2)
UROBILINOGEN UR QL STRIP: ABNORMAL
WBC # BLD AUTO: 5.9 10*3/MM3 (ref 3.4–10.8)
WBC SPEC QL WET PREP: ABNORMAL
WBC UR QL AUTO: ABNORMAL /HPF
WHOLE BLOOD HOLD SPECIMEN: NORMAL
YEAST GENITAL QL WET PREP: ABNORMAL

## 2021-08-22 PROCEDURE — 87086 URINE CULTURE/COLONY COUNT: CPT | Performed by: PHYSICIAN ASSISTANT

## 2021-08-22 PROCEDURE — 85025 COMPLETE CBC W/AUTO DIFF WBC: CPT | Performed by: PHYSICIAN ASSISTANT

## 2021-08-22 PROCEDURE — 84484 ASSAY OF TROPONIN QUANT: CPT | Performed by: PHYSICIAN ASSISTANT

## 2021-08-22 PROCEDURE — 87077 CULTURE AEROBIC IDENTIFY: CPT | Performed by: PHYSICIAN ASSISTANT

## 2021-08-22 PROCEDURE — 87591 N.GONORRHOEAE DNA AMP PROB: CPT | Performed by: PHYSICIAN ASSISTANT

## 2021-08-22 PROCEDURE — 0 IOPAMIDOL PER 1 ML: Performed by: PHYSICIAN ASSISTANT

## 2021-08-22 PROCEDURE — 83880 ASSAY OF NATRIURETIC PEPTIDE: CPT | Performed by: PHYSICIAN ASSISTANT

## 2021-08-22 PROCEDURE — 83605 ASSAY OF LACTIC ACID: CPT

## 2021-08-22 PROCEDURE — 87186 SC STD MICRODIL/AGAR DIL: CPT | Performed by: PHYSICIAN ASSISTANT

## 2021-08-22 PROCEDURE — 87040 BLOOD CULTURE FOR BACTERIA: CPT | Performed by: PHYSICIAN ASSISTANT

## 2021-08-22 PROCEDURE — 93005 ELECTROCARDIOGRAM TRACING: CPT | Performed by: INTERNAL MEDICINE

## 2021-08-22 PROCEDURE — 71045 X-RAY EXAM CHEST 1 VIEW: CPT

## 2021-08-22 PROCEDURE — 93005 ELECTROCARDIOGRAM TRACING: CPT

## 2021-08-22 PROCEDURE — 83690 ASSAY OF LIPASE: CPT | Performed by: PHYSICIAN ASSISTANT

## 2021-08-22 PROCEDURE — 87070 CULTURE OTHR SPECIMN AEROBIC: CPT | Performed by: PHYSICIAN ASSISTANT

## 2021-08-22 PROCEDURE — 82140 ASSAY OF AMMONIA: CPT | Performed by: PHYSICIAN ASSISTANT

## 2021-08-22 PROCEDURE — 80053 COMPREHEN METABOLIC PANEL: CPT | Performed by: PHYSICIAN ASSISTANT

## 2021-08-22 PROCEDURE — 81001 URINALYSIS AUTO W/SCOPE: CPT | Performed by: PHYSICIAN ASSISTANT

## 2021-08-22 PROCEDURE — 25010000002 CEFTRIAXONE PER 250 MG: Performed by: PHYSICIAN ASSISTANT

## 2021-08-22 PROCEDURE — 70450 CT HEAD/BRAIN W/O DYE: CPT

## 2021-08-22 PROCEDURE — 93005 ELECTROCARDIOGRAM TRACING: CPT | Performed by: PHYSICIAN ASSISTANT

## 2021-08-22 PROCEDURE — 87635 SARS-COV-2 COVID-19 AMP PRB: CPT | Performed by: PHYSICIAN ASSISTANT

## 2021-08-22 PROCEDURE — 87205 SMEAR GRAM STAIN: CPT | Performed by: PHYSICIAN ASSISTANT

## 2021-08-22 PROCEDURE — 82962 GLUCOSE BLOOD TEST: CPT

## 2021-08-22 PROCEDURE — 83735 ASSAY OF MAGNESIUM: CPT | Performed by: PHYSICIAN ASSISTANT

## 2021-08-22 PROCEDURE — 99285 EMERGENCY DEPT VISIT HI MDM: CPT

## 2021-08-22 PROCEDURE — 74177 CT ABD & PELVIS W/CONTRAST: CPT

## 2021-08-22 PROCEDURE — 82550 ASSAY OF CK (CPK): CPT | Performed by: PHYSICIAN ASSISTANT

## 2021-08-22 PROCEDURE — 84443 ASSAY THYROID STIM HORMONE: CPT | Performed by: PHYSICIAN ASSISTANT

## 2021-08-22 PROCEDURE — 87210 SMEAR WET MOUNT SALINE/INK: CPT | Performed by: PHYSICIAN ASSISTANT

## 2021-08-22 PROCEDURE — 87491 CHLMYD TRACH DNA AMP PROBE: CPT | Performed by: PHYSICIAN ASSISTANT

## 2021-08-22 RX ORDER — SODIUM CHLORIDE 0.9 % (FLUSH) 0.9 %
10 SYRINGE (ML) INJECTION AS NEEDED
Status: DISCONTINUED | OUTPATIENT
Start: 2021-08-22 | End: 2021-08-25 | Stop reason: HOSPADM

## 2021-08-22 RX ADMIN — Medication 10 ML: at 21:59

## 2021-08-22 RX ADMIN — IOPAMIDOL 100 ML: 755 INJECTION, SOLUTION INTRAVENOUS at 23:01

## 2021-08-22 RX ADMIN — Medication 10 ML: at 22:00

## 2021-08-22 RX ADMIN — WATER 1 G: 100 INJECTION, SOLUTION INTRAVENOUS at 21:59

## 2021-08-23 PROBLEM — N39.0 ACUTE UTI: Status: ACTIVE | Noted: 2021-08-23

## 2021-08-23 PROCEDURE — 25010000002 CEFTRIAXONE PER 250 MG: Performed by: INTERNAL MEDICINE

## 2021-08-23 RX ORDER — SENNOSIDES 8.6 MG
650 CAPSULE ORAL 3 TIMES DAILY
COMMUNITY
End: 2021-08-25 | Stop reason: HOSPADM

## 2021-08-23 RX ORDER — ONDANSETRON 2 MG/ML
4 INJECTION INTRAMUSCULAR; INTRAVENOUS EVERY 6 HOURS PRN
Status: DISCONTINUED | OUTPATIENT
Start: 2021-08-23 | End: 2021-08-25 | Stop reason: HOSPADM

## 2021-08-23 RX ORDER — ONDANSETRON 4 MG/1
4 TABLET, FILM COATED ORAL EVERY 8 HOURS PRN
Status: DISCONTINUED | OUTPATIENT
Start: 2021-08-23 | End: 2021-08-25 | Stop reason: HOSPADM

## 2021-08-23 RX ORDER — MIRTAZAPINE 15 MG/1
7.5 TABLET, FILM COATED ORAL NIGHTLY
COMMUNITY

## 2021-08-23 RX ORDER — FAMOTIDINE 20 MG/1
20 TABLET, FILM COATED ORAL DAILY
Status: DISCONTINUED | OUTPATIENT
Start: 2021-08-23 | End: 2021-08-25 | Stop reason: HOSPADM

## 2021-08-23 RX ORDER — ATORVASTATIN CALCIUM 10 MG/1
10 TABLET, FILM COATED ORAL NIGHTLY
Status: DISCONTINUED | OUTPATIENT
Start: 2021-08-23 | End: 2021-08-25 | Stop reason: HOSPADM

## 2021-08-23 RX ORDER — ONDANSETRON 4 MG/1
4 TABLET, FILM COATED ORAL EVERY 8 HOURS PRN
COMMUNITY

## 2021-08-23 RX ORDER — BUSPIRONE HYDROCHLORIDE 5 MG/1
5 TABLET ORAL 2 TIMES DAILY
COMMUNITY

## 2021-08-23 RX ORDER — ACETAMINOPHEN 325 MG/1
325 TABLET ORAL EVERY 4 HOURS PRN
Status: DISCONTINUED | OUTPATIENT
Start: 2021-08-23 | End: 2021-08-25 | Stop reason: HOSPADM

## 2021-08-23 RX ORDER — MIRTAZAPINE 15 MG/1
15 TABLET, FILM COATED ORAL NIGHTLY
Status: DISCONTINUED | OUTPATIENT
Start: 2021-08-23 | End: 2021-08-25 | Stop reason: HOSPADM

## 2021-08-23 RX ORDER — BISACODYL 10 MG
10 SUPPOSITORY, RECTAL RECTAL DAILY PRN
COMMUNITY

## 2021-08-23 RX ORDER — SODIUM CHLORIDE 9 MG/ML
100 INJECTION, SOLUTION INTRAVENOUS CONTINUOUS
Status: DISCONTINUED | OUTPATIENT
Start: 2021-08-23 | End: 2021-08-24

## 2021-08-23 RX ORDER — ASPIRIN 81 MG/1
81 TABLET, CHEWABLE ORAL DAILY
Status: DISCONTINUED | OUTPATIENT
Start: 2021-08-23 | End: 2021-08-25 | Stop reason: HOSPADM

## 2021-08-23 RX ORDER — DOCUSATE SODIUM 100 MG/1
100 CAPSULE, LIQUID FILLED ORAL DAILY
Status: DISCONTINUED | OUTPATIENT
Start: 2021-08-23 | End: 2021-08-25 | Stop reason: HOSPADM

## 2021-08-23 RX ORDER — BUSPIRONE HYDROCHLORIDE 5 MG/1
5 TABLET ORAL
Status: DISCONTINUED | OUTPATIENT
Start: 2021-08-23 | End: 2021-08-25 | Stop reason: HOSPADM

## 2021-08-23 RX ORDER — SODIUM CHLORIDE 0.9 % (FLUSH) 0.9 %
3 SYRINGE (ML) INJECTION EVERY 12 HOURS SCHEDULED
Status: DISCONTINUED | OUTPATIENT
Start: 2021-08-23 | End: 2021-08-25 | Stop reason: HOSPADM

## 2021-08-23 RX ORDER — SODIUM CHLORIDE 0.9 % (FLUSH) 0.9 %
3-10 SYRINGE (ML) INJECTION AS NEEDED
Status: DISCONTINUED | OUTPATIENT
Start: 2021-08-23 | End: 2021-08-25 | Stop reason: HOSPADM

## 2021-08-23 RX ORDER — DOCUSATE SODIUM 100 MG/1
100 CAPSULE, LIQUID FILLED ORAL DAILY
COMMUNITY

## 2021-08-23 RX ORDER — LIDOCAINE 4 G/G
PATCH TOPICAL DAILY
COMMUNITY

## 2021-08-23 RX ADMIN — ACETAMINOPHEN 325 MG: 325 TABLET, FILM COATED ORAL at 22:13

## 2021-08-23 RX ADMIN — CEFTRIAXONE SODIUM 1 G: 1 INJECTION, POWDER, FOR SOLUTION INTRAMUSCULAR; INTRAVENOUS at 21:03

## 2021-08-23 RX ADMIN — Medication 3 ML: at 09:27

## 2021-08-23 RX ADMIN — SODIUM CHLORIDE 100 ML/HR: 9 INJECTION, SOLUTION INTRAVENOUS at 09:28

## 2021-08-23 RX ADMIN — ATORVASTATIN CALCIUM 10 MG: 10 TABLET, FILM COATED ORAL at 21:03

## 2021-08-23 RX ADMIN — BUSPIRONE HYDROCHLORIDE 5 MG: 5 TABLET ORAL at 09:27

## 2021-08-23 RX ADMIN — METOPROLOL TARTRATE 12.5 MG: 25 TABLET, FILM COATED ORAL at 09:27

## 2021-08-23 RX ADMIN — BUSPIRONE HYDROCHLORIDE 5 MG: 5 TABLET ORAL at 17:04

## 2021-08-23 RX ADMIN — Medication 3 ML: at 21:04

## 2021-08-23 RX ADMIN — MIRTAZAPINE 15 MG: 15 TABLET, FILM COATED ORAL at 21:03

## 2021-08-23 RX ADMIN — DOCUSATE SODIUM 100 MG: 100 CAPSULE, LIQUID FILLED ORAL at 09:27

## 2021-08-23 RX ADMIN — FAMOTIDINE 20 MG: 20 TABLET, FILM COATED ORAL at 09:27

## 2021-08-23 RX ADMIN — ASPIRIN 81 MG: 81 TABLET, CHEWABLE ORAL at 09:27

## 2021-08-24 LAB
ANION GAP SERPL CALCULATED.3IONS-SCNC: 9 MMOL/L (ref 5–15)
BACTERIA SPEC AEROBE CULT: NO GROWTH
BASOPHILS # BLD AUTO: 0 10*3/MM3 (ref 0–0.2)
BASOPHILS NFR BLD AUTO: 0.6 % (ref 0–1.5)
BUN SERPL-MCNC: 15 MG/DL (ref 8–23)
BUN/CREAT SERPL: 32.6 (ref 7–25)
CALCIUM SPEC-SCNC: 8.5 MG/DL (ref 8.2–9.6)
CHLORIDE SERPL-SCNC: 104 MMOL/L (ref 98–107)
CO2 SERPL-SCNC: 28 MMOL/L (ref 22–29)
CREAT SERPL-MCNC: 0.46 MG/DL (ref 0.57–1)
DEPRECATED RDW RBC AUTO: 46.8 FL (ref 37–54)
EOSINOPHIL # BLD AUTO: 0.5 10*3/MM3 (ref 0–0.4)
EOSINOPHIL NFR BLD AUTO: 7.2 % (ref 0.3–6.2)
ERYTHROCYTE [DISTWIDTH] IN BLOOD BY AUTOMATED COUNT: 14.1 % (ref 12.3–15.4)
GFR SERPL CREATININE-BSD FRML MDRD: 126 ML/MIN/1.73
GLUCOSE SERPL-MCNC: 98 MG/DL (ref 65–99)
HCT VFR BLD AUTO: 35.6 % (ref 34–46.6)
HGB BLD-MCNC: 12 G/DL (ref 12–15.9)
LYMPHOCYTES # BLD AUTO: 1.4 10*3/MM3 (ref 0.7–3.1)
LYMPHOCYTES NFR BLD AUTO: 19.4 % (ref 19.6–45.3)
MCH RBC QN AUTO: 32.6 PG (ref 26.6–33)
MCHC RBC AUTO-ENTMCNC: 33.8 G/DL (ref 31.5–35.7)
MCV RBC AUTO: 96.4 FL (ref 79–97)
MONOCYTES # BLD AUTO: 0.7 10*3/MM3 (ref 0.1–0.9)
MONOCYTES NFR BLD AUTO: 9.4 % (ref 5–12)
NEUTROPHILS NFR BLD AUTO: 4.4 10*3/MM3 (ref 1.7–7)
NEUTROPHILS NFR BLD AUTO: 63.4 % (ref 42.7–76)
NRBC BLD AUTO-RTO: 0 /100 WBC (ref 0–0.2)
PLATELET # BLD AUTO: 324 10*3/MM3 (ref 140–450)
PMV BLD AUTO: 7.5 FL (ref 6–12)
POTASSIUM SERPL-SCNC: 3.7 MMOL/L (ref 3.5–5.2)
QT INTERVAL: 383 MS
RBC # BLD AUTO: 3.69 10*6/MM3 (ref 3.77–5.28)
SODIUM SERPL-SCNC: 141 MMOL/L (ref 136–145)
WBC # BLD AUTO: 7 10*3/MM3 (ref 3.4–10.8)

## 2021-08-24 PROCEDURE — 25010000002 CEFTRIAXONE PER 250 MG: Performed by: INTERNAL MEDICINE

## 2021-08-24 PROCEDURE — 80048 BASIC METABOLIC PNL TOTAL CA: CPT | Performed by: INTERNAL MEDICINE

## 2021-08-24 PROCEDURE — 85025 COMPLETE CBC W/AUTO DIFF WBC: CPT | Performed by: INTERNAL MEDICINE

## 2021-08-24 RX ORDER — CHOLECALCIFEROL (VITAMIN D3) 125 MCG
5 CAPSULE ORAL NIGHTLY PRN
Status: DISCONTINUED | OUTPATIENT
Start: 2021-08-24 | End: 2021-08-25 | Stop reason: HOSPADM

## 2021-08-24 RX ORDER — IPRATROPIUM BROMIDE AND ALBUTEROL SULFATE 2.5; .5 MG/3ML; MG/3ML
3 SOLUTION RESPIRATORY (INHALATION) EVERY 4 HOURS PRN
Status: DISCONTINUED | OUTPATIENT
Start: 2021-08-24 | End: 2021-08-25 | Stop reason: HOSPADM

## 2021-08-24 RX ORDER — BISACODYL 10 MG
10 SUPPOSITORY, RECTAL RECTAL DAILY PRN
Status: DISCONTINUED | OUTPATIENT
Start: 2021-08-24 | End: 2021-08-25 | Stop reason: HOSPADM

## 2021-08-24 RX ORDER — HYDRALAZINE HYDROCHLORIDE 20 MG/ML
20 INJECTION INTRAMUSCULAR; INTRAVENOUS EVERY 6 HOURS PRN
Status: DISCONTINUED | OUTPATIENT
Start: 2021-08-24 | End: 2021-08-25 | Stop reason: HOSPADM

## 2021-08-24 RX ORDER — MAGNESIUM SULFATE HEPTAHYDRATE 40 MG/ML
2 INJECTION, SOLUTION INTRAVENOUS AS NEEDED
Status: DISCONTINUED | OUTPATIENT
Start: 2021-08-24 | End: 2021-08-25 | Stop reason: HOSPADM

## 2021-08-24 RX ORDER — POTASSIUM CHLORIDE 20 MEQ/1
40 TABLET, EXTENDED RELEASE ORAL AS NEEDED
Status: DISCONTINUED | OUTPATIENT
Start: 2021-08-24 | End: 2021-08-25 | Stop reason: HOSPADM

## 2021-08-24 RX ORDER — POTASSIUM CHLORIDE 7.45 MG/ML
10 INJECTION INTRAVENOUS
Status: DISCONTINUED | OUTPATIENT
Start: 2021-08-24 | End: 2021-08-25 | Stop reason: HOSPADM

## 2021-08-24 RX ORDER — POTASSIUM CHLORIDE 1.5 G/1.77G
40 POWDER, FOR SOLUTION ORAL AS NEEDED
Status: DISCONTINUED | OUTPATIENT
Start: 2021-08-24 | End: 2021-08-25 | Stop reason: HOSPADM

## 2021-08-24 RX ORDER — CALCIUM CARBONATE 200(500)MG
2 TABLET,CHEWABLE ORAL 3 TIMES DAILY PRN
Status: DISCONTINUED | OUTPATIENT
Start: 2021-08-24 | End: 2021-08-25 | Stop reason: HOSPADM

## 2021-08-24 RX ORDER — MAGNESIUM SULFATE 1 G/100ML
1 INJECTION INTRAVENOUS AS NEEDED
Status: DISCONTINUED | OUTPATIENT
Start: 2021-08-24 | End: 2021-08-25 | Stop reason: HOSPADM

## 2021-08-24 RX ADMIN — FAMOTIDINE 20 MG: 20 TABLET, FILM COATED ORAL at 09:16

## 2021-08-24 RX ADMIN — BUSPIRONE HYDROCHLORIDE 5 MG: 5 TABLET ORAL at 09:16

## 2021-08-24 RX ADMIN — Medication 3 ML: at 09:16

## 2021-08-24 RX ADMIN — Medication 3 ML: at 21:54

## 2021-08-24 RX ADMIN — SODIUM CHLORIDE 100 ML/HR: 9 INJECTION, SOLUTION INTRAVENOUS at 12:37

## 2021-08-24 RX ADMIN — SODIUM CHLORIDE 100 ML/HR: 9 INJECTION, SOLUTION INTRAVENOUS at 00:22

## 2021-08-24 RX ADMIN — DOCUSATE SODIUM 100 MG: 100 CAPSULE, LIQUID FILLED ORAL at 09:16

## 2021-08-24 RX ADMIN — CEFTRIAXONE SODIUM 1 G: 1 INJECTION, POWDER, FOR SOLUTION INTRAMUSCULAR; INTRAVENOUS at 21:53

## 2021-08-24 RX ADMIN — ASPIRIN 81 MG: 81 TABLET, CHEWABLE ORAL at 09:15

## 2021-08-24 RX ADMIN — BUSPIRONE HYDROCHLORIDE 5 MG: 5 TABLET ORAL at 17:58

## 2021-08-24 RX ADMIN — ATORVASTATIN CALCIUM 10 MG: 10 TABLET, FILM COATED ORAL at 21:52

## 2021-08-24 RX ADMIN — METOPROLOL TARTRATE 12.5 MG: 25 TABLET, FILM COATED ORAL at 09:16

## 2021-08-24 RX ADMIN — MIRTAZAPINE 15 MG: 15 TABLET, FILM COATED ORAL at 21:52

## 2021-08-25 VITALS
HEART RATE: 83 BPM | BODY MASS INDEX: 16.07 KG/M2 | RESPIRATION RATE: 14 BRPM | WEIGHT: 87.3 LBS | HEIGHT: 62 IN | DIASTOLIC BLOOD PRESSURE: 79 MMHG | SYSTOLIC BLOOD PRESSURE: 132 MMHG | OXYGEN SATURATION: 97 % | TEMPERATURE: 98 F

## 2021-08-25 PROBLEM — E43 SEVERE MALNUTRITION (HCC): Status: ACTIVE | Noted: 2021-08-25

## 2021-08-25 RX ORDER — CEFDINIR 300 MG/1
300 CAPSULE ORAL 2 TIMES DAILY
Qty: 14 CAPSULE | Refills: 0 | Status: SHIPPED | OUTPATIENT
Start: 2021-08-25 | End: 2021-09-01

## 2021-08-25 RX ORDER — ACETAMINOPHEN 325 MG/1
325 TABLET ORAL EVERY 4 HOURS PRN
Qty: 30 TABLET | Refills: 0 | Status: SHIPPED | OUTPATIENT
Start: 2021-08-25

## 2021-08-25 RX ADMIN — ASPIRIN 81 MG: 81 TABLET, CHEWABLE ORAL at 09:57

## 2021-08-25 RX ADMIN — DOCUSATE SODIUM 100 MG: 100 CAPSULE, LIQUID FILLED ORAL at 09:57

## 2021-08-25 RX ADMIN — MELATONIN TAB 5 MG 5 MG: 5 TAB at 01:38

## 2021-08-25 RX ADMIN — BUSPIRONE HYDROCHLORIDE 5 MG: 5 TABLET ORAL at 09:57

## 2021-08-25 RX ADMIN — METOPROLOL TARTRATE 12.5 MG: 25 TABLET, FILM COATED ORAL at 09:57

## 2021-08-25 RX ADMIN — FAMOTIDINE 20 MG: 20 TABLET, FILM COATED ORAL at 09:57

## 2021-08-25 RX ADMIN — Medication 3 ML: at 09:58

## 2021-08-26 LAB
BACTERIA SPEC AEROBE CULT: ABNORMAL
BACTERIA SPEC AEROBE CULT: ABNORMAL
GRAM STN SPEC: ABNORMAL
GRAM STN SPEC: ABNORMAL

## 2021-08-27 LAB
BACTERIA SPEC AEROBE CULT: NORMAL
BACTERIA SPEC AEROBE CULT: NORMAL

## 2022-09-07 ENCOUNTER — APPOINTMENT (OUTPATIENT)
Dept: GENERAL RADIOLOGY | Facility: HOSPITAL | Age: 87
End: 2022-09-07

## 2022-09-07 ENCOUNTER — APPOINTMENT (OUTPATIENT)
Dept: CT IMAGING | Facility: HOSPITAL | Age: 87
End: 2022-09-07

## 2022-09-07 ENCOUNTER — HOSPITAL ENCOUNTER (OUTPATIENT)
Facility: HOSPITAL | Age: 87
Setting detail: OBSERVATION
Discharge: NURSING, REHAB OR LONG-TERM CARE | End: 2022-09-09
Attending: EMERGENCY MEDICINE | Admitting: INTERNAL MEDICINE

## 2022-09-07 DIAGNOSIS — I62.00 SUBDURAL HEMORRHAGE: Primary | ICD-10-CM

## 2022-09-07 DIAGNOSIS — M25.462 EFFUSION, LEFT KNEE: ICD-10-CM

## 2022-09-07 DIAGNOSIS — S01.01XA LACERATION OF SCALP, INITIAL ENCOUNTER: ICD-10-CM
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
## 2022-09-07 PROBLEM — I10 HTN (HYPERTENSION): Chronic | Status: ACTIVE | Noted: 2022-09-07

## 2022-09-07 PROBLEM — E43 SEVERE MALNUTRITION (HCC): Chronic | Status: ACTIVE | Noted: 2021-08-25

## 2022-09-07 PROBLEM — I48.91 ATRIAL FIBRILLATION (HCC): Chronic | Status: ACTIVE | Noted: 2019-11-26

## 2022-09-07 LAB
ALBUMIN SERPL-MCNC: 4 G/DL (ref 3.5–5.2)
ALBUMIN/GLOB SERPL: 1.1 G/DL
ALP SERPL-CCNC: 59 U/L (ref 39–117)
ALT SERPL W P-5'-P-CCNC: 10 U/L (ref 1–33)
ANION GAP SERPL CALCULATED.3IONS-SCNC: 11 MMOL/L (ref 5–15)
AST SERPL-CCNC: 20 U/L (ref 1–32)
BACTERIA UR QL AUTO: ABNORMAL /HPF
BASOPHILS # BLD AUTO: 0 10*3/MM3 (ref 0–0.2)
BASOPHILS NFR BLD AUTO: 0.3 % (ref 0–1.5)
BILIRUB SERPL-MCNC: 1 MG/DL (ref 0–1.2)
BILIRUB UR QL STRIP: NEGATIVE
BUN SERPL-MCNC: 26 MG/DL (ref 8–23)
BUN/CREAT SERPL: 41.9 (ref 7–25)
CALCIUM SPEC-SCNC: 9.3 MG/DL (ref 8.2–9.6)
CHLORIDE SERPL-SCNC: 98 MMOL/L (ref 98–107)
CLARITY UR: ABNORMAL
CO2 SERPL-SCNC: 28 MMOL/L (ref 22–29)
COLOR UR: YELLOW
CREAT SERPL-MCNC: 0.62 MG/DL (ref 0.57–1)
DEPRECATED RDW RBC AUTO: 50.8 FL (ref 37–54)
EGFRCR SERPLBLD CKD-EPI 2021: 81.6 ML/MIN/1.73
EOSINOPHIL # BLD AUTO: 0 10*3/MM3 (ref 0–0.4)
EOSINOPHIL NFR BLD AUTO: 0.4 % (ref 0.3–6.2)
ERYTHROCYTE [DISTWIDTH] IN BLOOD BY AUTOMATED COUNT: 15.1 % (ref 12.3–15.4)
GLOBULIN UR ELPH-MCNC: 3.7 GM/DL
GLUCOSE SERPL-MCNC: 145 MG/DL (ref 65–99)
GLUCOSE UR STRIP-MCNC: NEGATIVE MG/DL
HCT VFR BLD AUTO: 35.5 % (ref 34–46.6)
HGB BLD-MCNC: 11.2 G/DL (ref 12–15.9)
HGB UR QL STRIP.AUTO: ABNORMAL
HYALINE CASTS UR QL AUTO: ABNORMAL /LPF
KETONES UR QL STRIP: NEGATIVE
LEUKOCYTE ESTERASE UR QL STRIP.AUTO: ABNORMAL
LYMPHOCYTES # BLD AUTO: 1.3 10*3/MM3 (ref 0.7–3.1)
LYMPHOCYTES NFR BLD AUTO: 13.1 % (ref 19.6–45.3)
MCH RBC QN AUTO: 30.6 PG (ref 26.6–33)
MCHC RBC AUTO-ENTMCNC: 31.6 G/DL (ref 31.5–35.7)
MCV RBC AUTO: 96.9 FL (ref 79–97)
MONOCYTES # BLD AUTO: 0.7 10*3/MM3 (ref 0.1–0.9)
MONOCYTES NFR BLD AUTO: 7.5 % (ref 5–12)
NEUTROPHILS NFR BLD AUTO: 7.6 10*3/MM3 (ref 1.7–7)
NEUTROPHILS NFR BLD AUTO: 78.7 % (ref 42.7–76)
NITRITE UR QL STRIP: POSITIVE
NRBC BLD AUTO-RTO: 0 /100 WBC (ref 0–0.2)
PH UR STRIP.AUTO: 7.5 [PH] (ref 5–8)
PLATELET # BLD AUTO: 295 10*3/MM3 (ref 140–450)
PMV BLD AUTO: 8.1 FL (ref 6–12)
POTASSIUM SERPL-SCNC: 4.4 MMOL/L (ref 3.5–5.2)
PROT SERPL-MCNC: 7.7 G/DL (ref 6–8.5)
PROT UR QL STRIP: NEGATIVE
RBC # BLD AUTO: 3.67 10*6/MM3 (ref 3.77–5.28)
RBC # UR STRIP: ABNORMAL /HPF
REF LAB TEST METHOD: ABNORMAL
SODIUM SERPL-SCNC: 137 MMOL/L (ref 136–145)
SP GR UR STRIP: 1.02 (ref 1–1.03)
SQUAMOUS #/AREA URNS HPF: ABNORMAL /HPF
TSH SERPL DL<=0.05 MIU/L-ACNC: 2.47 UIU/ML (ref 0.27–4.2)
UROBILINOGEN UR QL STRIP: ABNORMAL
WBC # UR STRIP: ABNORMAL /HPF
WBC NRBC COR # BLD: 9.6 10*3/MM3 (ref 3.4–10.8)

## 2022-09-07 PROCEDURE — 36415 COLL VENOUS BLD VENIPUNCTURE: CPT

## 2022-09-07 PROCEDURE — 99283 EMERGENCY DEPT VISIT LOW MDM: CPT | Performed by: NURSE PRACTITIONER

## 2022-09-07 PROCEDURE — G0378 HOSPITAL OBSERVATION PER HR: HCPCS

## 2022-09-07 PROCEDURE — 84443 ASSAY THYROID STIM HORMONE: CPT | Performed by: INTERNAL MEDICINE

## 2022-09-07 PROCEDURE — 87086 URINE CULTURE/COLONY COUNT: CPT | Performed by: INTERNAL MEDICINE

## 2022-09-07 PROCEDURE — 73700 CT LOWER EXTREMITY W/O DYE: CPT

## 2022-09-07 PROCEDURE — 85025 COMPLETE CBC W/AUTO DIFF WBC: CPT | Performed by: INTERNAL MEDICINE

## 2022-09-07 PROCEDURE — 36415 COLL VENOUS BLD VENIPUNCTURE: CPT | Performed by: INTERNAL MEDICINE

## 2022-09-07 PROCEDURE — 73502 X-RAY EXAM HIP UNI 2-3 VIEWS: CPT

## 2022-09-07 PROCEDURE — 80053 COMPREHEN METABOLIC PANEL: CPT | Performed by: INTERNAL MEDICINE

## 2022-09-07 PROCEDURE — 87186 SC STD MICRODIL/AGAR DIL: CPT | Performed by: INTERNAL MEDICINE

## 2022-09-07 PROCEDURE — 70450 CT HEAD/BRAIN W/O DYE: CPT

## 2022-09-07 PROCEDURE — 99284 EMERGENCY DEPT VISIT MOD MDM: CPT

## 2022-09-07 PROCEDURE — 72125 CT NECK SPINE W/O DYE: CPT

## 2022-09-07 PROCEDURE — 73562 X-RAY EXAM OF KNEE 3: CPT

## 2022-09-07 PROCEDURE — 81001 URINALYSIS AUTO W/SCOPE: CPT | Performed by: INTERNAL MEDICINE

## 2022-09-07 RX ORDER — TRAMADOL HYDROCHLORIDE 50 MG/1
50 TABLET ORAL EVERY 6 HOURS PRN
Status: DISCONTINUED | OUTPATIENT
Start: 2022-09-07 | End: 2022-09-09 | Stop reason: HOSPADM

## 2022-09-07 RX ORDER — SODIUM CHLORIDE 0.9 % (FLUSH) 0.9 %
3-10 SYRINGE (ML) INJECTION AS NEEDED
Status: DISCONTINUED | OUTPATIENT
Start: 2022-09-07 | End: 2022-09-09 | Stop reason: HOSPADM

## 2022-09-07 RX ORDER — ATORVASTATIN CALCIUM 10 MG/1
10 TABLET, FILM COATED ORAL NIGHTLY
Status: DISCONTINUED | OUTPATIENT
Start: 2022-09-07 | End: 2022-09-09 | Stop reason: HOSPADM

## 2022-09-07 RX ORDER — SODIUM CHLORIDE 0.9 % (FLUSH) 0.9 %
3 SYRINGE (ML) INJECTION EVERY 12 HOURS SCHEDULED
Status: DISCONTINUED | OUTPATIENT
Start: 2022-09-07 | End: 2022-09-09 | Stop reason: HOSPADM

## 2022-09-07 RX ORDER — ONDANSETRON 4 MG/1
4 TABLET, FILM COATED ORAL EVERY 8 HOURS PRN
Status: DISCONTINUED | OUTPATIENT
Start: 2022-09-07 | End: 2022-09-09 | Stop reason: HOSPADM

## 2022-09-07 RX ORDER — BISACODYL 10 MG
10 SUPPOSITORY, RECTAL RECTAL DAILY PRN
Status: DISCONTINUED | OUTPATIENT
Start: 2022-09-07 | End: 2022-09-09 | Stop reason: HOSPADM

## 2022-09-07 RX ORDER — FAMOTIDINE 20 MG/1
40 TABLET, FILM COATED ORAL DAILY
Status: DISCONTINUED | OUTPATIENT
Start: 2022-09-07 | End: 2022-09-09

## 2022-09-07 RX ORDER — ACETAMINOPHEN 325 MG/1
325 TABLET ORAL EVERY 4 HOURS PRN
Status: DISCONTINUED | OUTPATIENT
Start: 2022-09-07 | End: 2022-09-09 | Stop reason: HOSPADM

## 2022-09-07 RX ORDER — BUSPIRONE HYDROCHLORIDE 5 MG/1
5 TABLET ORAL EVERY 12 HOURS SCHEDULED
Status: DISCONTINUED | OUTPATIENT
Start: 2022-09-07 | End: 2022-09-09 | Stop reason: HOSPADM

## 2022-09-07 RX ORDER — LIDOCAINE HYDROCHLORIDE AND EPINEPHRINE 10; 10 MG/ML; UG/ML
10 INJECTION, SOLUTION INFILTRATION; PERINEURAL ONCE
Status: COMPLETED | OUTPATIENT
Start: 2022-09-07 | End: 2022-09-07

## 2022-09-07 RX ORDER — ONDANSETRON 2 MG/ML
4 INJECTION INTRAMUSCULAR; INTRAVENOUS EVERY 6 HOURS PRN
Status: DISCONTINUED | OUTPATIENT
Start: 2022-09-07 | End: 2022-09-09 | Stop reason: HOSPADM

## 2022-09-07 RX ORDER — DOCUSATE SODIUM 100 MG/1
100 CAPSULE, LIQUID FILLED ORAL DAILY
Status: DISCONTINUED | OUTPATIENT
Start: 2022-09-07 | End: 2022-09-09 | Stop reason: HOSPADM

## 2022-09-07 RX ORDER — MIRTAZAPINE 7.5 MG/1
7.5 TABLET, FILM COATED ORAL NIGHTLY
Status: DISCONTINUED | OUTPATIENT
Start: 2022-09-07 | End: 2022-09-09 | Stop reason: HOSPADM

## 2022-09-07 RX ADMIN — DOCUSATE SODIUM 100 MG: 100 CAPSULE, LIQUID FILLED ORAL at 22:37

## 2022-09-07 RX ADMIN — BUSPIRONE HYDROCHLORIDE 5 MG: 5 TABLET ORAL at 22:37

## 2022-09-07 RX ADMIN — METOPROLOL TARTRATE 12.5 MG: 25 TABLET, FILM COATED ORAL at 22:37

## 2022-09-07 RX ADMIN — LIDOCAINE HYDROCHLORIDE,EPINEPHRINE BITARTRATE 10 ML: 10; .01 INJECTION, SOLUTION INFILTRATION; PERINEURAL at 05:52

## 2022-09-07 RX ADMIN — MIRTAZAPINE 7.5 MG: 7.5 TABLET ORAL at 22:37

## 2022-09-07 RX ADMIN — Medication 3 ML: at 22:36

## 2022-09-07 RX ADMIN — ATORVASTATIN CALCIUM 10 MG: 10 TABLET, FILM COATED ORAL at 22:37

## 2022-09-07 NOTE — DISCHARGE PLACEMENT REQUEST
"Carlos Arenas (89 y.o. Female)             Date of Birth   10/23/1925    Social Security Number       Address   [address] [place] [address] [place] [address]    Home Phone   [phone]    MRN   [id number]       Rastafari   Protestant    Marital Status                               Admission Date   9/7/22    Admission Type   Emergency    Admitting Provider   Radha Sosa MD    Attending Provider   Radha Sosa MD    Department, Room/Bed   [facility] EMERGENCY DEPARTMENT, 10/10       Discharge Date       Discharge Disposition       Discharge Destination                               Attending Provider: Radha Sosa MD    Allergies: Penicillins    Isolation: None   Infection: None   Code Status: No CPR   Advance Care Planning Activity    Ht: 157.5 cm (62\")   Wt: 39.5 kg (87 lb)    Admission Cmt: None   Principal Problem: None                Active Insurance as of 9/7/2022     Primary Coverage     Payor Plan Insurance Group Employer/Plan Group    MEDICARE MEDICARE A & B      Payor Plan Address Payor Plan Phone Number Payor Plan Fax Number Effective Dates    [address] [phone]  10/1/1990 - None Entered    [facility] [id number]       Subscriber Name Subscriber Birth Date Member ID       CARLOS ARENAS 10/23/1925 [id number]           Secondary Coverage     Payor Plan Insurance Group Employer/Plan Group    [facility] SUPP [id number]     Payor Plan Address Payor Plan Phone Number Payor Plan Fax Number Effective Dates    [address]   6/1/2019 - None Entered    [facility] [id number]       Subscriber Name Subscriber Birth Date Member ID       CARLOS ARENAS 10/23/1925 [id number]           Tertiary Coverage     Payor Plan Insurance Group Employer/Plan Group    [id number]      Payor Plan Address Payor Plan Phone Number Payor Plan Fax Number Effective Dates    [address]   8/1/2022 - None Entered    [place] [address]       " Subscriber Name Subscriber Birth Date Member ID       CARLOS LOU 10/23/1925 [id number]                 Emergency Contacts      (Rel.) Home Phone Work Phone Mobile Phone    LEILA LOU (Power of ) [phone] -- [phone]

## 2022-09-07 NOTE — PLAN OF CARE
Problem: Adult Inpatient Plan of Care  Goal: Plan of Care Review  Outcome: Ongoing, Progressing  Flowsheets (Taken 9/7/2022 1821)  Progress: improving  Plan of Care Reviewed With: patient  Outcome Evaluation: Pt came from [place] with fall Hx. Pt appears confused. but can answer simple questions. purewick on due to incontinent. will continue to monitor.

## 2022-09-07 NOTE — H&P
Patient Care Team:  Floyd Wilson MD as PCP - General (Family Medicine)    Chief complaint head trauma    Subjective     Patient is a 89 y.o. female with history of paroxysmal atrial fib, hypertension, dementia who presents from her nursing home with head injury after rolling out of bed.  She had a significant laceration on the left forehead which has been repaired.  She has been found to have subdural hematoma.  She is complaining of left knee pain.    Review of Systems   Constitutional: Positive for activity change. Negative for appetite change, chills, diaphoresis, fatigue, fever and unexpected weight change.   HENT: Negative for facial swelling, hearing loss, mouth sores, nosebleeds, rhinorrhea, sinus pressure, sinus pain, sore throat and tinnitus.    Eyes: Negative for visual disturbance.   Respiratory: Negative for cough, shortness of breath, wheezing and stridor.    Cardiovascular: Negative for chest pain, palpitations and leg swelling.   Gastrointestinal: Negative for abdominal pain, blood in stool, constipation, diarrhea, nausea and vomiting.   Endocrine: Negative for polyuria.   Genitourinary: Negative for dysuria.   Musculoskeletal: Positive for arthralgias, gait problem and joint swelling.   Skin: Positive for wound. Negative for rash.   Neurological: Positive for headaches. Negative for tremors, seizures, syncope, speech difficulty, weakness and numbness.   Psychiatric/Behavioral: Negative for confusion.          History  Past Medical History:   Diagnosis Date   • Atrial fibrillation (HCC)    • Hypertension    • Severe malnutrition (HCC) 8/25/2021     No past surgical history on file.  History reviewed. No pertinent family history.  Social History     Tobacco Use   • Smoking status: Never Smoker   • Smokeless tobacco: Never Used     (Not in a hospital admission)    Allergies:  Penicillins    Objective     Vital Signs  Temp:  [97.5 °F (36.4 °C)] 97.5 °F (36.4 °C)  Heart Rate:  [71-83] 76  Resp:   [16-24] 24  BP: (140-164)/(73-87) 148/80     Physical Exam:      General Appearance:    Alert, cooperative, in no acute distress, oriented x2, strong urine smell in room   Head:     Repaired laceration left forehead, skin tear, evidence of significant bleeding   Eyes:            Lids and lashes normal, conjunctivae and sclerae normal, no   icterus, no pallor, corneas clear, PERRLA   Ears:    Ears appear intact with no abnormalities noted   Throat:   No oral lesions, no thrush, oral mucosa moist   Neck:   No adenopathy, supple, trachea midline, no thyromegaly, no   carotid bruit, no JVD   Lungs:     Clear to auscultation,respirations regular, even and                  unlabored    Heart:    Regular rhythm and normal rate, normal S1 and S2, no            murmur, no gallop, no rub, no click   Chest Wall:    No abnormalities observed   Abdomen:     Normal bowel sounds, no masses, no organomegaly, soft        non-tender, non-distended, no guarding, no rebound                tenderness   Extremities:  Ecchymosis left patella, pain with flexion and extension of left knee   Pulses:   Pulses palpable and equal bilaterally   Skin:   No bleeding, bruising or rash   Lymph nodes:   No palpable adenopathy   Neurologic:   Cranial nerves 2 - 12 grossly intact, sensation intact, DTR       present and equal bilaterally       Results Review:     Imaging Results (Last 24 Hours)     Procedure Component Value Units Date/Time    CT Cervical Spine Without Contrast [[id number]] Collected: 09/07/22 0739     Updated: 09/07/22 0749    Narrative:      DATE OF EXAM:  9/7/2022 6:30 AM     PROCEDURE:  CT CERVICAL SPINE WO CONTRAST-     INDICATIONS:   trauma. Pain status post fall     COMPARISON:   11/26/2019 and prior     TECHNIQUE:  Routine transaxial slices were obtained through the cervical spine  without the administration of intravenous contrast. Reconstructed  coronal and sagittal images were also obtained. Automated exposure  control and  iterative construction methods were used.      FINDINGS:  Exaggerated kyphosis of the thoracic spine incompletely evaluated. The  compression deformity of T3 noted on the previous study is not as  well-visualized on the current study. Mild central loss of height of  multiple vertebral bodies of the lower cervical and upper thoracic spine  appears stable. Diffuse osteopenia limits detection of subtle  abnormalities. Given this limitation no definite acute osseous  abnormality is noted. The prevertebral soft tissues are within normal  limits.     Lateral masses of C1 align normally on C2. The tip of the dens appears  intact. Facet joints appear maintained. Mild diffuse degenerative  changes are noted. Limited imaging lung apices is unremarkable       Impression:      Given the underlying osteopenia and there is no evidence of acute  fracture or subluxation.     Remote T3 compression deformity incompletely evaluated again  appreciated.     Electronically Signed By-Manjinder Ignacio On:9/7/2022 7:47 AM  This report was finalized on [id number] by  Manjinder Ignacio, .    CT Head Without Contrast [[id number]] Collected: 09/07/22 0730     Updated: 09/07/22 0747    Narrative:      CT HEAD WO CONTRAST-     Date of Exam: 9/7/2022 6:30 AM     Indication: trauma.  Headache     Comparison: 8/22/2021 and prior     Technique:  Contiguous axial CT images of the head were obtained without  contrast from skull base to vertex.  Coronal and sagittal  reconstructions were performed.  Automated exposure control and  iterative reconstruction methods were used.       FINDINGS:  Brain/Ventricles: There is high density along the left convexity of the  brain extending from the frontal to the occipital region and into the  superior aspect of the middle cranial fossa. This measures up to 5 mm in  thickness and has a mixed appearance suggestive of acute and chronic  blood products. No acute hemorrhage is noted within the brain  parenchyma.  There is a moderate to advanced degree of diffuse atrophy  noted. Extensive white matter changes compatible small vessel ischemic  disease appreciated which is not unexpected in a patient of this age  group.     Orbits: The visualized portion of the orbits demonstrate no acute  abnormality.     Sinuses:The visualized paranasal sinuses and mastoid air cells  demonstrate no acute abnormality.     Soft Tissues/Skull: Small contusions are noted over the left for head  and scalp and posteriorly over the left parietal vertex. No underlying  acute osseous abnormality noted       Impression:      Subdural hemorrhage with acute and possibly some chronic components. No  associated mass effect or midline shift noted. No acute intracranial  hemorrhage is otherwise noted     Findings were discussed with the provider caring for the patient, VITALY Orr at 7:30 AM at the time of interpretation     Electronically Signed By-Manjinder Ignacio On:9/7/2022 7:39 AM  This report was finalized on [id number] by  Manjinder Ignacio, .    XR Knee 3 View Left [[id number]] Collected: 09/07/22 0700     Updated: 09/07/22 0704    Narrative:      DATE OF EXAM:  9/7/2022 6:50 AM     PROCEDURE:  XR KNEE 3 VW LEFT-     INDICATIONS:  trauma     COMPARISON:  No Comparisons Available     TECHNIQUE:   Three radiologic views of the left knee were obtained.        FINDINGS:  Diffuse osteopenia limits detection of subtle abnormalities. There is a  large joint effusion no displaced fracture is identified. Vascular  calcifications are noted.        Impression:      Diffuse osteopenia limits detection of subtle abnormalities. No  displaced fracture identified     Large joint effusion noted.     Additional imaging should be obtained as clinically indicated if there  is clinical concern for occult fracture     Electronically Signed By-Manjinder Ignacio On:9/7/2022 7:02 AM  This report was finalized on [id number] by  Manjinder Ignacio, .    XR Hip With  or Without Pelvis 2 - 3 View Left [[id number]] Collected: 09/07/22 0656     Updated: 09/07/22 0702    Narrative:      DATE OF EXAM:  9/7/2022 6:49 AM     PROCEDURE:  XR HIP W OR WO PELVIS 2-3 VIEW LEFT-     INDICATIONS:  trauma     COMPARISON:  No Comparisons Available     TECHNIQUE:   Two views of the left hip and one view of the pelvis were obtained.        FINDINGS:  Study is limited by severe diffuse osteopenia.     The bony pelvis appears to be intact. Evaluation of the sacrum is  limited no gross acute abnormality. Remote compression deformities of  lumbar vertebral bodies are similar to prior CTs.     Dedicated imaging of the left hip demonstrates no definite displaced  fracture.     There is diffuse atrophy of the soft tissues. Peripheral vascular  calcifications noted.        Impression:      Severe osteopenia limits detection of subtle abnormalities.     Given limitations of exam no displaced fracture is identified. Follow-up  imaging can be obtained as clinically indicated     Electronically Signed By-Manjinder Ignacio On:9/7/2022 6:59 AM  This report was finalized on [id number] by  Manjinder Ignacio, .           Lab Results (last 24 hours)     ** No results found for the last 24 hours. **           I reviewed the patient's new clinical results.    Assessment & Plan       Atrial fibrillation (HCC)    Severe malnutrition (HCC)    Effusion of left knee    Fall    HTN (hypertension)    Subdural hemorrhage (HCC)  -Observe; neurochecks; no indication for intervention at this time.  Holding all blood thinners.    History of atrial fib -check EKG  Dementia -continue home medication  Confusion -check urinalysis; UTI suspected  Left knee pain -CT scan to rule out occult fracture    SCDs for DVT prophylaxis  Pepcid for stress ulcer prophylaxis    Patient is DNR/DNI based on previous discussions with patient and family.    I discussed the patient's findings and my recommendations with patient.     Radha Sosa,  MD  09/07/22  10:16 EDT

## 2022-09-07 NOTE — ED NOTES
Pt rolled out of bed out of [place] and obtained a head laceration. Pt unable to speak. Facility states pt takes 81mg aspirin daily

## 2022-09-07 NOTE — CONSULTS
[facility]   Consult Note    Patient Name: Sierra Arenas  : 10/23/1925  MRN: [id number]  Primary Care Physician:  Floyd Wilson MD  Referring Physician: No Known Provider  Date of admission: 2022    Inpatient Neurosurgery Consult  Consult performed by: Toya Jolly APRN  Consult ordered by: Radha Sosa MD        Subjective   Subjective     Reason for Consult/ Chief Complaint: Fall, subdural hematoma    History of Present Illness  Sierra Arenas is a 89 y.o. female severely demented nursing home patient who suffered a fall out of her bed.  Per chart review, patient is nonverbal and does not follow commands which is her baseline.  She does have a medical history of A. fib and hypertension.  ED work-up included CT of the head imaging which demonstrated a small left convexity subdural hematoma with mixed fluid components. Neurosurgery was consulted for findings for further assessment/evaluation and offer recommendations.  Upon my assessment, no family at bedside, patient is lying in bed and mainly nonverbal.  I did get her to voice that she does have a headache.  Her GCS is 4- 2- 5. She is moving all extremities spontaneously.  She has a left forehead laceration that has been recently sutured.        Review of Systems   Unable to perform ROS: Dementia        Personal History     Past Medical History:   Diagnosis Date   • Atrial fibrillation (HCC)    • Hypertension    • Severe malnutrition (HCC) 2021       No past surgical history on file.    Family History: family history is not on file. Otherwise pertinent FHx was reviewed and not pertinent to current issue.    Social History:  reports that she has never smoked. She has never used smokeless tobacco.    Home Medications:   Lidocaine, acetaminophen, aspirin, atorvastatin, bisacodyl, busPIRone, docusate sodium, metoprolol tartrate, mirtazapine, and ondansetron    Allergies:  Allergies   Allergen Reactions   • Penicillins Unknown - Low Severity        Objective    Objective     Vitals:  Temp:  [97.5 °F (36.4 °C)] 97.5 °F (36.4 °C)  Heart Rate:  [71-83] 80  Resp:  [16-24] 24  BP: (140-164)/(73-87) 150/80    Physical Exam  Vitals reviewed.   Constitutional:       General: She is not in acute distress.     Appearance: She is underweight.   HENT:      Head: Laceration present.        Comments: Laceration recently sutured  Eyes:      Pupils: Pupils are equal, round, and reactive to light.   Cardiovascular:      Rate and Rhythm: Tachycardia present. Rhythm irregular.      Pulses: Normal pulses.   Pulmonary:      Effort: Pulmonary effort is normal.      Breath sounds: Normal breath sounds.   Abdominal:      General: Abdomen is flat.      Palpations: Abdomen is soft.   Musculoskeletal:      Cervical back: Neck supple. No tenderness.   Neurological:      GCS: GCS eye subscore is 4. GCS verbal subscore is 2. GCS motor subscore is 5.      Cranial Nerves: No cranial nerve deficit.   Psychiatric:         Speech: She is noncommunicative.         Behavior: Behavior is cooperative.         Cognition and Memory: Memory is impaired.         Result Review    Result Review:  I have personally reviewed the results from the time of this admission to 9/7/2022 12:40 EDT and agree with these findings:  []  Laboratory list / accordion  []  Microbiology  [x]  Radiology  []  EKG/Telemetry   []  Cardiology/Vascular   []  Pathology  []  Old records  []  Other:  Most notable findings include: CT Head Without Contrast   CT HEAD WO CONTRAST-     Date of Exam: 9/7/2022 6:30 AM     Indication: trauma.  Headache     Comparison: 8/22/2021 and prior     Technique:  Contiguous axial CT images of the head were obtained without  contrast from skull base to vertex.  Coronal and sagittal  reconstructions were performed.  Automated exposure control and  iterative reconstruction methods were used.       FINDINGS:  Brain/Ventricles: There is high density along the left convexity of the  brain extending  from the frontal to the occipital region and into the  superior aspect of the middle cranial fossa. This measures up to 5 mm in  thickness and has a mixed appearance suggestive of acute and chronic  blood products. No acute hemorrhage is noted within the brain  parenchyma. There is a moderate to advanced degree of diffuse atrophy  noted. Extensive white matter changes compatible small vessel ischemic  disease appreciated which is not unexpected in a patient of this age  group.     Orbits: The visualized portion of the orbits demonstrate no acute  abnormality.     Sinuses:The visualized paranasal sinuses and mastoid air cells  demonstrate no acute abnormality.     Soft Tissues/Skull: Small contusions are noted over the left for head  and scalp and posteriorly over the left parietal vertex. No underlying  acute osseous abnormality noted     IMPRESSION:  Subdural hemorrhage with acute and possibly some chronic components. No  associated mass effect or midline shift noted. No acute intracranial  hemorrhage is otherwise noted     Findings were discussed with the provider caring for the patient, VITALY Orr at 7:30 AM at the time of interpretation     Electronically Signed By-Manjinder Ignacio On:9/7/2022 7:39 AM  This report was finalized on [id number] by  Manjinder Ignacio, .      Assessment & Plan   Assessment / Plan     Brief Patient Summary:  Sierra Arenas is a 89 y.o. female with severe dementia that suffered a fall out of her bed resulting in a small left-sided convexity subdural hematoma.  There are acute and chronic components associated with the fluid collection suggesting previous falls.  There is no mass-effect or midline shift.  Neurologically, her GCS is 4- 6- 4.  Patient was able to answer simple questions and was able to follow simple commands.  She does move all extremities.  There is no neurosurgical intervention planned.  We are recommending every 4 hour neurochecks, systolic blood pressure  should be less than 150.  She is okay for diet.  Patient is also recommended to have a PT/OT eval for gait evaluation as she does have evidence of prior head injuries with the mixed fluid components seen in her subdural hematoma.  We will order a repeat CT head 6 hours from original scan to assess stability.  If stable, no further work-up indicated.  We would recommend that if patient is on any anticoagulation that this be held for at least 2 weeks with repeat imaging prior to restart.    Active Hospital Problems:  Active Hospital Problems    Diagnosis    • Effusion of left knee    • Fall    • HTN (hypertension)    • Subdural hemorrhage (HCC)    • Severe malnutrition (HCC)    • Atrial fibrillation (HCC)      Plan:     Acute on chronic SDH    - Repeat CT head 6 hours from original scan  - Every 4 hours neurochecks  - SBP< 150  - Recommend PT/OT eval  - Okay for diet  - Hold all AC/AP therapy  - Repeat CT head 2 weeks prior to any restart of any AC/AP therapy  - Call for any questions or concerns    Hypertension    - Management per primary with recommendations 2 keep systolic blood pressure less than 150    This patient was examined wearing appropriate personal protective equipment.     Findings were discussed with nursing and Dr. Alcantar.      MALCOLM Stevens

## 2022-09-07 NOTE — ED PROVIDER NOTES
Subjective   PIT    89-year-old female transferred from nursing home after fall out of bed.  Patient is nonverbal and does not follow commands.  Patient apparently is at her baseline.          Review of Systems   Unable to perform ROS: Patient nonverbal       Past Medical History:   Diagnosis Date   • Atrial fibrillation (HCC)    • Hypertension    • Severe malnutrition (HCC) 8/25/2021       Allergies   Allergen Reactions   • Penicillins Unknown - Low Severity       No past surgical history on file.    History reviewed. No pertinent family history.    Social History     Socioeconomic History   • Marital status:    Tobacco Use   • Smoking status: Never Smoker   • Smokeless tobacco: Never Used           Objective   Physical Exam  Constitutional:       Comments: Thin elderly female no acute distress   HENT:      Head:      Comments: 2.5 cm left frontal scalp wound with active hemorrhage     Mouth/Throat:      Mouth: Mucous membranes are moist.      Pharynx: Oropharynx is clear.   Eyes:      Pupils: Pupils are equal, round, and reactive to light.   Cardiovascular:      Rate and Rhythm: Normal rate and regular rhythm.      Heart sounds: Normal heart sounds.   Pulmonary:      Effort: Pulmonary effort is normal.      Breath sounds: Normal breath sounds.   Abdominal:      General: Bowel sounds are normal. There is no distension.      Palpations: Abdomen is soft.      Tenderness: There is no abdominal tenderness.   Musculoskeletal:      Comments: Pain with range of motion left hip, mild swelling left knee   Skin:     General: Skin is warm and dry.      Capillary Refill: Capillary refill takes less than 2 seconds.   Neurological:      Comments: Nonverbal does not follow commands, moves all extremities no facial asymmetry seen         Laceration Repair    Date/Time: 9/7/2022 5:55 AM  Performed by: Juan Cohen MD  Authorized by: Juan Cohen MD     Consent:     Consent obtained:  Emergent situation  Pre-procedure  "details:     Preparation:  Patient was prepped and draped in usual sterile fashion  Exploration:     Hemostasis achieved with:  Direct pressure    Wound exploration: wound explored through full range of motion      Contaminated: no    Treatment:     Area cleansed with:  Chlorhexidine and saline    Amount of cleaning:  Standard    Irrigation solution:  Sterile saline  Skin repair:     Repair method:  Sutures    Suture size:  4-0    Suture material:  Nylon    Suture technique:  Simple interrupted    Number of sutures:  5  Approximation:     Approximation:  Close  Repair type:     Repair type:  Simple  Post-procedure details:     Dressing:  Antibiotic ointment    Procedure completion:  Tolerated               ED Course  ED Course as of 09/07/22 0949   Wed Sep 07, 2022   0730 Patient care assumed from Dr. Cohen, pending CT imaging []   0735 Spoke with radiologist regarding head CT findings. []   0740 Consult placed to neurosurgery []   0809 Spoke with neurosurgery, Dr. Wright, recommended patient be admitted to hospitalist, repeat CT 6 hours, neurosurgery will consult. []   0813 Spoke with KEVIN Carrasco who agreed accept patient for Dr. Barraza []   0827 Patient is seen by Dr. Sosa, orders changed []      ED Course User Index  [] Anjali Bates PA    /80   Pulse 76   Temp 97.5 °F (36.4 °C) (Rectal)   Resp 24   Ht 157.5 cm (62\")   Wt 39.5 kg (87 lb)   SpO2 97%   BMI 15.91 kg/m²   Labs Reviewed - No data to display  Medications   lidocaine 1% - EPINEPHrine 1:203833 (XYLOCAINE W/EPI) 1 %-1:761185 injection 10 mL (10 mL Injection Given 9/7/22 0552)     XR Knee 3 View Left    Result Date: 9/7/2022  Diffuse osteopenia limits detection of subtle abnormalities. No displaced fracture identified  Large joint effusion noted.  Additional imaging should be obtained as clinically indicated if there is clinical concern for occult fracture  Electronically Signed By-Manjinder Ignacio On:9/7/2022 7:02 AM This " report was finalized on [id number] by  Manjinder Ignacio, .    CT Head Without Contrast    Result Date: 9/7/2022  Subdural hemorrhage with acute and possibly some chronic components. No associated mass effect or midline shift noted. No acute intracranial hemorrhage is otherwise noted  Findings were discussed with the provider caring for the patient, VITALY Orr at 7:30 AM at the time of interpretation  Electronically Signed ByAriel Ignacio On:9/7/2022 7:39 AM This report was finalized on [id number] by  Manjinder Ignacio, .    CT Cervical Spine Without Contrast    Result Date: 9/7/2022  Given the underlying osteopenia and there is no evidence of acute fracture or subluxation.  Remote T3 compression deformity incompletely evaluated again appreciated.  Electronically Signed By-Manjinder Ignacio On:9/7/2022 7:47 AM This report was finalized on [id number] by  Manjinder Ignacio, .    XR Hip With or Without Pelvis 2 - 3 View Left    Result Date: 9/7/2022  Severe osteopenia limits detection of subtle abnormalities.  Given limitations of exam no displaced fracture is identified. Follow-up imaging can be obtained as clinically indicated  Electronically Signed ByAriel Ignacio On:9/7/2022 6:59 AM This report was finalized on [id number] by  Manjinder Ignacio, .                                           MDM  Number of Diagnoses or Management Options  Effusion, left knee  Fall, initial encounter  Laceration of scalp, initial encounter  Subdural hemorrhage (HCC)  Diagnosis management comments: MEDICAL DECISION  Epic Chart Review: No recent admissions  Comorbidities: A. fib, hypertension, severe malnutrition  Differentials: Fracture contusion, abrasion, subdural hemorrhage, intracranial hemorrhage; this list is not all inclusive and does not constitute the entirety of considered causes  Radiology interpretation:  Images reviewed by me and interpreted by radiologist, as above  Lab interpretation:  Labs  viewed by me significant for, not warranted    Patient initial evaluation, physical exam, laceration repair performed by Dr. Cohen.  Imaging also ordered per Dr. Cohen.  Imaging was reviewed by myself, VITALY Orr.  I spoke with radiologist regarding head CT showing small left-sided subdural 5 mm.  Consult was neurosurgery, spoke with Dr. Wright regarding these findings.  Patient is DNR.  Patient can be admitted to hospitalist, repeat head CT in 6 hours and neurosurgery would consult.  Patient to be admitted to Dr. De La Garza.  Patient otherwise unremarkable ER course.    While in the ED appropriate PPE was worn during exam and throughout all encounters with the patient.         Amount and/or Complexity of Data Reviewed  Tests in the radiology section of CPT®: reviewed and ordered    Patient Progress  Patient progress: stable      Final diagnoses:   Subdural hemorrhage (HCC)   Laceration of scalp, initial encounter   Fall, initial encounter   Effusion, left knee       ED Disposition  ED Disposition     ED Disposition   Decision to Admit    Condition   --    Comment   Level of Care: Telemetry [5]   Admitting Physician: LUCAS DE LA GARZA [[id number]]   Attending Physician: LUCAS DE LA GARZA [[id number]]   Bed Request Comments: makenzie?               No follow-up provider specified.       Medication List      No changes were made to your prescriptions during this visit.          Anjali Bates PA  09/07/22 0936

## 2022-09-08 LAB
D-LACTATE SERPL-SCNC: 2.1 MMOL/L (ref 0.5–2)
D-LACTATE SERPL-SCNC: 2.6 MMOL/L (ref 0.5–2)
D-LACTATE SERPL-SCNC: 2.7 MMOL/L (ref 0.5–2)
D-LACTATE SERPL-SCNC: 2.9 MMOL/L (ref 0.5–2)

## 2022-09-08 PROCEDURE — 99214 OFFICE O/P EST MOD 30 MIN: CPT | Performed by: NURSE PRACTITIONER

## 2022-09-08 PROCEDURE — 25010000002 CEFTRIAXONE PER 250 MG: Performed by: NURSE PRACTITIONER

## 2022-09-08 PROCEDURE — 83605 ASSAY OF LACTIC ACID: CPT | Performed by: NURSE PRACTITIONER

## 2022-09-08 PROCEDURE — G0378 HOSPITAL OBSERVATION PER HR: HCPCS

## 2022-09-08 PROCEDURE — 87040 BLOOD CULTURE FOR BACTERIA: CPT | Performed by: NURSE PRACTITIONER

## 2022-09-08 RX ORDER — RISPERIDONE 0.25 MG/1
0.5 TABLET ORAL DAILY
Status: DISCONTINUED | OUTPATIENT
Start: 2022-09-08 | End: 2022-09-09 | Stop reason: HOSPADM

## 2022-09-08 RX ADMIN — TRAMADOL HYDROCHLORIDE 50 MG: 50 TABLET ORAL at 01:41

## 2022-09-08 RX ADMIN — Medication 3 ML: at 20:08

## 2022-09-08 RX ADMIN — ATORVASTATIN CALCIUM 10 MG: 10 TABLET, FILM COATED ORAL at 20:04

## 2022-09-08 RX ADMIN — FAMOTIDINE 40 MG: 20 TABLET, FILM COATED ORAL at 07:51

## 2022-09-08 RX ADMIN — BUSPIRONE HYDROCHLORIDE 5 MG: 5 TABLET ORAL at 07:51

## 2022-09-08 RX ADMIN — MIRTAZAPINE 7.5 MG: 7.5 TABLET ORAL at 20:04

## 2022-09-08 RX ADMIN — Medication 3 ML: at 07:52

## 2022-09-08 RX ADMIN — RISPERIDONE 0.5 MG: 0.25 TABLET ORAL at 16:47

## 2022-09-08 RX ADMIN — CEFTRIAXONE 1 G: 1 INJECTION, POWDER, FOR SOLUTION INTRAMUSCULAR; INTRAVENOUS at 15:49

## 2022-09-08 RX ADMIN — BUSPIRONE HYDROCHLORIDE 5 MG: 5 TABLET ORAL at 20:04

## 2022-09-08 RX ADMIN — METOPROLOL TARTRATE 12.5 MG: 25 TABLET, FILM COATED ORAL at 07:51

## 2022-09-08 RX ADMIN — DOCUSATE SODIUM 100 MG: 100 CAPSULE, LIQUID FILLED ORAL at 07:52

## 2022-09-08 NOTE — PLAN OF CARE
Problem: Fall Injury Risk  Goal: Absence of Fall and Fall-Related Injury  Intervention: Identify and Manage Contributors  Recent Flowsheet Documentation  Taken 9/7/2022 2000 by Roger Lucero, RN  Medication Review/Management:   medications reviewed   high-risk medications identified  Intervention: Promote Injury-Free Environment  Recent Flowsheet Documentation  Taken 9/8/2022 0400 by Roger Lucero, RN  Safety Promotion/Fall Prevention:   activity supervised   assistive device/personal items within reach   clutter free environment maintained   fall prevention program maintained   gait belt   safety round/check completed   room organization consistent   nonskid shoes/slippers when out of bed  Taken 9/8/2022 0000 by Roger Lucero, RN  Safety Promotion/Fall Prevention:   activity supervised   assistive device/personal items within reach   clutter free environment maintained   fall prevention program maintained   gait belt   nonskid shoes/slippers when out of bed   safety round/check completed   room organization consistent  Taken 9/7/2022 2200 by Roger Lucero, RN  Safety Promotion/Fall Prevention:   activity supervised   assistive device/personal items within reach   clutter free environment maintained   fall prevention program maintained   gait belt   nonskid shoes/slippers when out of bed   safety round/check completed   room organization consistent  Taken 9/7/2022 2000 by Roger Lucero, RN  Safety Promotion/Fall Prevention:   activity supervised   assistive device/personal items within reach   clutter free environment maintained   fall prevention program maintained   gait belt   safety round/check completed   room organization consistent   nonskid shoes/slippers when out of bed   Goal Outcome Evaluation:      Patient confused.  Disoriented to place, time, situation.  Very hard of hearing.  Vitals WNL.  Turn every 2 hours.

## 2022-09-08 NOTE — PROGRESS NOTES
NEUROSURGERY PROGRESS NOTE     LOS: 0 days   Patient Care Team:  Floyd Wilson MD as PCP - General (Family Medicine)    Chief Complaint: Fall/subdural hematoma    Subjective     Interval History:     No acute events overnight.  Patient remains awake and alert and pleasantly confused.  She is very hard of hearing.    While in the room and during my examination of the patient I wore a mask and eye protection.  I washed my hands before and after this patient encounter.  The patient was also wearing a mask.     History taken from: patient chart RN    Objective      Vital Signs  Temp:  [97.4 °F (36.3 °C)-99.2 °F (37.3 °C)] 97.4 °F (36.3 °C)  Heart Rate:  [69-94] 69  Resp:  [19-25] 21  BP: (139-166)/(78-85) 164/80  Body mass index is 18.47 kg/m².    Intake/Output last 3 shifts:  I/O last 3 completed shifts:  In: 336 [P.O.:336]  Out: -     Intake/Output this shift:  No intake/output data recorded.    Physical    General:  Awake, alert, confused. NAD moving all extremities, following commands+/-, left forehead laceration, scattered bruising along left forehead and face     Results Review:     I reviewed the patient's new clinical results.  I reviewed the patient's new imaging results and agree with the interpretation.    Labs:    Lab Results (last 24 hours)     Procedure Component Value Units Date/Time    Urinalysis With Culture If Indicated - Urine, Random Void [[id number]]  (Abnormal) Collected: 09/07/22 1214    Specimen: Urine, Random Void Updated: 09/07/22 1228     Color, UA Yellow     Appearance, UA Cloudy     pH, UA 7.5     Specific Gravity, UA 1.016     Glucose, UA Negative     Ketones, UA Negative     Bilirubin, UA Negative     Blood, UA Small (1+)     Protein, UA Negative     Leuk Esterase, UA Large (3+)     Nitrite, UA Positive     Urobilinogen, UA 0.2 E.U./dL    Narrative:      In absence of clinical symptoms, the presence of pyuria, bacteria, and/or nitrites on the urinalysis result does not correlate with  infection.    Urinalysis, Microscopic Only - Urine, Random Void [[id number]]  (Abnormal) Collected: 09/07/22 1214    Specimen: Urine, Random Void Updated: 09/07/22 1251     RBC, UA 0-2 /HPF      WBC, UA 31-50 /HPF      Bacteria, UA 3+ /HPF      Squamous Epithelial Cells, UA 0-2 /HPF      Hyaline Casts, UA None Seen /LPF      Methodology Manual Light Microscopy    Urine Culture - Urine, Urine, Random Void [[id number]] Collected: 09/07/22 1214    Specimen: Urine, Random Void Updated: 09/07/22 1251    CBC & Differential [[id number]]  (Abnormal) Collected: 09/07/22 1326    Specimen: Blood from Arm, Left Updated: 09/07/22 1332    Narrative:      The following orders were created for panel order CBC & Differential.  Procedure                               Abnormality         Status                     ---------                               -----------         ------                     CBC Auto Differential[[id number]]        Abnormal            Final result                 Please view results for these tests on the individual orders.    Comprehensive Metabolic Panel [[id number]]  (Abnormal) Collected: 09/07/22 1326    Specimen: Blood from Arm, Left Updated: 09/07/22 1401     Glucose 145 mg/dL      BUN 26 mg/dL      Creatinine 0.62 mg/dL      Sodium 137 mmol/L      Potassium 4.4 mmol/L      Chloride 98 mmol/L      CO2 28.0 mmol/L      Calcium 9.3 mg/dL      Total Protein 7.7 g/dL      Albumin 4.00 g/dL      ALT (SGPT) 10 U/L      AST (SGOT) 20 U/L      Alkaline Phosphatase 59 U/L      Total Bilirubin 1.0 mg/dL      Globulin 3.7 gm/dL      A/G Ratio 1.1 g/dL      BUN/Creatinine Ratio 41.9     Anion Gap 11.0 mmol/L      eGFR 81.6 mL/min/1.73      Comment: National Kidney Foundation and American Society of Nephrology (ASN) Task Force recommended calculation based on the Chronic Kidney Disease Epidemiology Collaboration (CKD-EPI) equation refit without adjustment for race.       Narrative:      GFR Normal >60  Chronic Kidney  Disease <60  Kidney Failure <15      TSH [[id number]]  (Normal) Collected: 09/07/22 1326    Specimen: Blood from Arm, Left Updated: 09/07/22 1407     TSH 2.470 uIU/mL     CBC Auto Differential [[id number]]  (Abnormal) Collected: 09/07/22 1326    Specimen: Blood from Arm, Left Updated: 09/07/22 1332     WBC 9.60 10*3/mm3      RBC 3.67 10*6/mm3      Hemoglobin 11.2 g/dL      Hematocrit 35.5 %      MCV 96.9 fL      MCH 30.6 pg      MCHC 31.6 g/dL      RDW 15.1 %      RDW-SD 50.8 fl      MPV 8.1 fL      Platelets 295 10*3/mm3      Neutrophil % 78.7 %      Lymphocyte % 13.1 %      Monocyte % 7.5 %      Eosinophil % 0.4 %      Basophil % 0.3 %      Neutrophils, Absolute 7.60 10*3/mm3      Lymphocytes, Absolute 1.30 10*3/mm3      Monocytes, Absolute 0.70 10*3/mm3      Eosinophils, Absolute 0.00 10*3/mm3      Basophils, Absolute 0.00 10*3/mm3      nRBC 0.0 /100 WBC           Imaging:    I personally reviewed the images from the following radiographic studies.    DATE OF EXAM:  9/7/2022 1:06 PM     PROCEDURE:   CT HEAD WO CONTRAST-     INDICATIONS:   Subdural hematoma, follow-up.      COMPARISON:  09/07/2022 at 0627 hours.     TECHNIQUE:   Routine transaxial cuts were obtained through the head without the  administration of contrast. Automated exposure control and iterative  reconstruction methods were used.      FINDINGS:  The acute subdural hematoma overlying the left cerebral hemisphere  measures 5 mm in thickness. It is unchanged from the previous study of.  It mainly overlies the left frontal and temporal lobes. There is no mass  effect or midline shift. The patient also has a left frontal scalp  contusion. There is chronic volume loss. There is enlargement the sulci,  fissures, ventricles, and basal cisterns. There are areas of decreased  density in the white matter tracts consistent with a chronic  microvascular ischemia. There are bilateral basal ganglia senile  calcifications. There are atherosclerotic vascular  calcifications in the  carotid siphons, distal vertebral arteries, and the basilar artery. The  patient appears to had previous cataract surgery. There is no depressed  skull fracture. The visualized paranasal and mastoid sinuses are clear.      IMPRESSION:  Stable 5 mm thick acute subdural hematoma overlying the left cerebral  hemisphere. There is no mass effect or midline shift. There are  additional findings as noted above.     Electronically Signed By-Herrera Ortiz MD On:9/7/2022 1:16 PM  This report was finalized on [id number] by  Herrera Ortiz MD.           Current Medications:   Scheduled Meds:atorvastatin, 10 mg, Oral, Nightly  busPIRone, 5 mg, Oral, Q12H  docusate sodium, 100 mg, Oral, Daily  famotidine, 40 mg, Oral, Daily  metoprolol tartrate, 12.5 mg, Oral, Daily  mirtazapine, 7.5 mg, Oral, Nightly  sodium chloride, 3 mL, Intravenous, Q12H      Continuous Infusions:     Assessment & Plan       Atrial fibrillation (HCC)    Severe malnutrition (HCC)    Effusion of left knee    Fall    HTN (hypertension)    Subdural hemorrhage (HCC)      Assessment: Patient is a 89-year-old female that suffered a fall out of bed at her nursing facility.  Initial imaging demonstrated a left-sided subdural hematoma with acute and chronic components.  She has baseline dementia.  Her GCS score remains at a 4-6-4.  She is moving all extremities and following commands+/-.  She underwent serial imaging which demonstrates stable findings.  There is no mass-effect or midline shift.  No additional serial imaging planned.  Blood pressure control recommended with systolic less than 150, recommendations also to hold any anticoagulation/antiplatelet therapy for at least 2 weeks.  Patient does not require any follow-up.  Neurosurgery will sign off at this time but is available for any questions or concerns.    Plan:    -Recommendations for SBP<150  - No AC/AP for least 2 weeks  - No follow-up needed  - Call for any questions or  concerns    I discussed my findings with patient, nursing and Dr. Alcantar.    This patient was examined wearing appropriate personal protective equipment.       MALCOLM Stevens  09/08/22  07:33 EDT

## 2022-09-08 NOTE — PLAN OF CARE
Goal Outcome Evaluation:  Plan of Care Reviewed With: patient        Progress: no change  Outcome Evaluation: Pt continues with confusion. Pt remains to be a fall and skin injury risk. Will continue to monitor.

## 2022-09-08 NOTE — PROGRESS NOTES
LOS: 0 days   Patient Care Team:  Floyd Wilson MD as PCP - General (Family Medicine)    Subjective         Review of Systems   Unable to perform ROS: Mental status change           Objective     Vital Signs  Temp:  [97.4 °F (36.3 °C)-99.2 °F (37.3 °C)] 97.4 °F (36.3 °C)  Heart Rate:  [69-94] 75  Resp:  [19-25] 21  BP: (139-166)/(78-85) 159/83      Physical Exam  Vitals reviewed.   Constitutional:       Appearance: She is not ill-appearing.   HENT:      Head: Normocephalic and atraumatic.      Right Ear: External ear normal.      Left Ear: External ear normal.      Nose: Nose normal.      Mouth/Throat:      Mouth: Mucous membranes are moist.   Eyes:      General:         Right eye: No discharge.         Left eye: No discharge.   Cardiovascular:      Rate and Rhythm: Normal rate and regular rhythm.      Pulses: Normal pulses.      Heart sounds: Normal heart sounds.   Pulmonary:      Effort: Pulmonary effort is normal.      Breath sounds: Normal breath sounds.   Abdominal:      General: Bowel sounds are normal.      Palpations: Abdomen is soft.   Musculoskeletal:         General: Normal range of motion.      Cervical back: Normal range of motion.   Skin:     General: Skin is warm.      Coloration: Skin is pale.   Neurological:      Mental Status: She is alert. She is disoriented.   Psychiatric:         Cognition and Memory: Cognition is impaired. Memory is impaired.              Results Review:    Lab Results (last 24 hours)     Procedure Component Value Units Date/Time    TSH [[id number]]  (Normal) Collected: 09/07/22 1326    Specimen: Blood from Arm, Left Updated: 09/07/22 1407     TSH 2.470 uIU/mL     Comprehensive Metabolic Panel [[id number]]  (Abnormal) Collected: 09/07/22 1326    Specimen: Blood from Arm, Left Updated: 09/07/22 1401     Glucose 145 mg/dL      BUN 26 mg/dL      Creatinine 0.62 mg/dL      Sodium 137 mmol/L      Potassium 4.4 mmol/L      Chloride 98 mmol/L      CO2 28.0 mmol/L      Calcium  9.3 mg/dL      Total Protein 7.7 g/dL      Albumin 4.00 g/dL      ALT (SGPT) 10 U/L      AST (SGOT) 20 U/L      Alkaline Phosphatase 59 U/L      Total Bilirubin 1.0 mg/dL      Globulin 3.7 gm/dL      A/G Ratio 1.1 g/dL      BUN/Creatinine Ratio 41.9     Anion Gap 11.0 mmol/L      eGFR 81.6 mL/min/1.73      Comment: National Kidney Foundation and American Society of Nephrology (ASN) Task Force recommended calculation based on the Chronic Kidney Disease Epidemiology Collaboration (CKD-EPI) equation refit without adjustment for race.       Narrative:      GFR Normal >60  Chronic Kidney Disease <60  Kidney Failure <15      CBC & Differential [[id number]]  (Abnormal) Collected: 09/07/22 1326    Specimen: Blood from Arm, Left Updated: 09/07/22 1332    Narrative:      The following orders were created for panel order CBC & Differential.  Procedure                               Abnormality         Status                     ---------                               -----------         ------                     CBC Auto Differential[[id number]]        Abnormal            Final result                 Please view results for these tests on the individual orders.    CBC Auto Differential [[id number]]  (Abnormal) Collected: 09/07/22 1326    Specimen: Blood from Arm, Left Updated: 09/07/22 1332     WBC 9.60 10*3/mm3      RBC 3.67 10*6/mm3      Hemoglobin 11.2 g/dL      Hematocrit 35.5 %      MCV 96.9 fL      MCH 30.6 pg      MCHC 31.6 g/dL      RDW 15.1 %      RDW-SD 50.8 fl      MPV 8.1 fL      Platelets 295 10*3/mm3      Neutrophil % 78.7 %      Lymphocyte % 13.1 %      Monocyte % 7.5 %      Eosinophil % 0.4 %      Basophil % 0.3 %      Neutrophils, Absolute 7.60 10*3/mm3      Lymphocytes, Absolute 1.30 10*3/mm3      Monocytes, Absolute 0.70 10*3/mm3      Eosinophils, Absolute 0.00 10*3/mm3      Basophils, Absolute 0.00 10*3/mm3      nRBC 0.0 /100 WBC     Urinalysis, Microscopic Only - Urine, Random Void [[id number]]  (Abnormal)  Collected: 09/07/22 1214    Specimen: Urine, Random Void Updated: 09/07/22 1251     RBC, UA 0-2 /HPF      WBC, UA 31-50 /HPF      Bacteria, UA 3+ /HPF      Squamous Epithelial Cells, UA 0-2 /HPF      Hyaline Casts, UA None Seen /LPF      Methodology Manual Light Microscopy    Urine Culture - Urine, Urine, Random Void [[id number]] Collected: 09/07/22 1214    Specimen: Urine, Random Void Updated: 09/07/22 1251    Urinalysis With Culture If Indicated - Urine, Random Void [[id number]]  (Abnormal) Collected: 09/07/22 1214    Specimen: Urine, Random Void Updated: 09/07/22 1228     Color, UA Yellow     Appearance, UA Cloudy     pH, UA 7.5     Specific Gravity, UA 1.016     Glucose, UA Negative     Ketones, UA Negative     Bilirubin, UA Negative     Blood, UA Small (1+)     Protein, UA Negative     Leuk Esterase, UA Large (3+)     Nitrite, UA Positive     Urobilinogen, UA 0.2 E.U./dL    Narrative:      In absence of clinical symptoms, the presence of pyuria, bacteria, and/or nitrites on the urinalysis result does not correlate with infection.           Imaging Results (Last 24 Hours)     Procedure Component Value Units Date/Time    CT Head Without Contrast [[id number]] Collected: 09/07/22 1311     Updated: 09/07/22 1318    Narrative:         DATE OF EXAM:  9/7/2022 1:06 PM     PROCEDURE:   CT HEAD WO CONTRAST-     INDICATIONS:   Subdural hematoma, follow-up.      COMPARISON:  09/07/2022 at 0627 hours.     TECHNIQUE:   Routine transaxial cuts were obtained through the head without the  administration of contrast. Automated exposure control and iterative  reconstruction methods were used.      FINDINGS:  The acute subdural hematoma overlying the left cerebral hemisphere  measures 5 mm in thickness. It is unchanged from the previous study of.  It mainly overlies the left frontal and temporal lobes. There is no mass  effect or midline shift. The patient also has a left frontal scalp  contusion. There is chronic volume loss. There  is enlargement the sulci,  fissures, ventricles, and basal cisterns. There are areas of decreased  density in the white matter tracts consistent with a chronic  microvascular ischemia. There are bilateral basal ganglia senile  calcifications. There are atherosclerotic vascular calcifications in the  carotid siphons, distal vertebral arteries, and the basilar artery. The  patient appears to had previous cataract surgery. There is no depressed  skull fracture. The visualized paranasal and mastoid sinuses are clear.        Impression:      Stable 5 mm thick acute subdural hematoma overlying the left cerebral  hemisphere. There is no mass effect or midline shift. There are  additional findings as noted above.     Electronically Signed By-Herrera Ortiz MD On:9/7/2022 1:16 PM  This report was finalized on [id number] by  Herrera Ortiz MD.    CT Lower Extremity Left Without Contrast [[id number]] Collected: 09/07/22 1213     Updated: 09/07/22 1222    Narrative:         DATE OF EXAM:   9/7/2022 10:45 AM     PROCEDURE:   CT LOWER EXTREMITY LEFT WO CONTRAST-     INDICATIONS:   Knee pain, stress fracture suspected, neg xray; I62.00-Nontraumatic  subdural hemorrhage, unspecified; S01.01XA-Laceration without foreign  body of scalp, initial encounter; W19.XXXA-Unspecified fall, initial  encounter; M25.462-Effusion, left knee     COMPARISON:  Radiograph performed same day     TECHNIQUE:   CT of the left knee was obtained without the administration of contrast.  Coronal and sagittal reformats were obtained. Automated exposure control  and alternative reconstruction methods were used.     FINDINGS:   There is severe osteopenia which limits detection of subtle  abnormalities.     There is a mildly comminuted fracture through the lateral aspect of the  patella without significant displacement.     The femur, tibia and fibula appear intact.     There is a moderate size fluid collection within the joint space which  demonstrates increased  density compatible with hemarthrosis. Mild degree  of prepatellar soft tissue swelling is noted.     Peripheral vascular calcifications noted.             Impression:      Mildly comminuted fractures without significant displacement of the  patella. This is occult on the radiograph.     Prepatellar soft tissue swelling and moderate size hemarthrosis.     No definite acute fractures otherwise noted given limitations from  severe osteopenia.     Electronically Signed By-Manjinder Ignacio On:9/7/2022 12:20 PM  This report was finalized on [id number] by  Manjinder Ignacio, .               I reviewed the patient's new clinical results.    Medication Review:   Scheduled Meds:atorvastatin, 10 mg, Oral, Nightly  busPIRone, 5 mg, Oral, Q12H  cefTRIAXone, 1 g, Intravenous, Q24H  docusate sodium, 100 mg, Oral, Daily  famotidine, 40 mg, Oral, Daily  metoprolol tartrate, 12.5 mg, Oral, Daily  mirtazapine, 7.5 mg, Oral, Nightly  sodium chloride, 3 mL, Intravenous, Q12H      Continuous Infusions:   PRN Meds:.•  acetaminophen  •  bisacodyl  •  ondansetron  •  ondansetron  •  sodium chloride  •  traMADol     Interval History:    Assessment & Plan       Atrial fibrillation (HCC)    Severe malnutrition (HCC)    Effusion of left knee    Fall    HTN (hypertension)    Subdural hemorrhage (HCC)    Observe; neurochecks; no indication for intervention at this time.  Holding all blood thinners.     History of atrial fib -check EKG  Dementia -continue home medication  Confusion -most likely from UTI with culture pending on rocephin  Left knee pain -CT scan to rule out occult fracture     SCDs for DVT prophylaxis  Pepcid for stress ulcer prophylaxis     Patient is DNR/DNI based on previous discussions with patient and family  Plan for disposition:CHERELLE Valenzuela, MALCOLM  09/08/22  09:55 EDT

## 2022-09-08 NOTE — DISCHARGE PLACEMENT REQUEST
"Carlos Arenas (89 y.o. Female)             Date of Birth   10/23/1925    Social Security Number       Address   [address] [place] [address] [place] [address]    Home Phone   [phone]    MRN   [id number]       Roman Catholic   Episcopal    Marital Status                               Admission Date   9/7/22    Admission Type   Emergency    Admitting Provider   Radha Sosa MD    Attending Provider   Radha Sosa MD    Department, Room/Bed   [facility] MEDICAL INPATIENT, 248/1       Discharge Date       Discharge Disposition       Discharge Destination                               Attending Provider: Radha Sosa MD    Allergies: Penicillins    Isolation: None   Infection: None   Code Status: No CPR   Advance Care Planning Activity    Ht: 157.5 cm (62\")   Wt: 45.8 kg (100 lb 15.5 oz)    Admission Cmt: None   Principal Problem: None                Active Insurance as of 9/7/2022     Primary Coverage     Payor Plan Insurance Group Employer/Plan Group    MEDICARE MEDICARE A & B      Payor Plan Address Payor Plan Phone Number Payor Plan Fax Number Effective Dates    [address] [phone]  10/1/1990 - None Entered    [facility] [id number]       Subscriber Name Subscriber Birth Date Member ID       CARLOS ARENAS 10/23/1925 [id number]           Secondary Coverage     Payor Plan Insurance Group Employer/Plan Group    ANTH [employer]OSS [facility] [id number]     Payor Plan Address Payor Plan Phone Number Payor Plan Fax Number Effective Dates    [address]   6/1/2019 - None Entered    [facility] [id number]       Subscriber Name Subscriber Birth Date Member ID       CARLOS ARENAS 10/23/1925 [id number]           Tertiary Coverage     Payor Plan Insurance Group Employer/Plan Group    [id number]      Payor Plan Address Payor Plan Phone Number Payor Plan Fax Number Effective Dates    [address]   8/1/2022 - None Entered    [place] [address]   "     Subscriber Name Subscriber Birth Date Member ID       CARLOS LOU 10/23/1925 [id number]                 Emergency Contacts      (Rel.) Home Phone Work Phone Mobile Phone    LEILA LOU (Power of ) [phone] -- [phone]               History & Physical      Radha Sosa MD at 09/07/22 1016              Patient Care Team:  Floyd Wilson MD as PCP - General (Family Medicine)    Chief complaint head trauma    Subjective     Patient is a 89 y.o. female with history of paroxysmal atrial fib, hypertension, dementia who presents from her nursing home with head injury after rolling out of bed.  She had a significant laceration on the left forehead which has been repaired.  She has been found to have subdural hematoma.  She is complaining of left knee pain.    Review of Systems   Constitutional: Positive for activity change. Negative for appetite change, chills, diaphoresis, fatigue, fever and unexpected weight change.   HENT: Negative for facial swelling, hearing loss, mouth sores, nosebleeds, rhinorrhea, sinus pressure, sinus pain, sore throat and tinnitus.    Eyes: Negative for visual disturbance.   Respiratory: Negative for cough, shortness of breath, wheezing and stridor.    Cardiovascular: Negative for chest pain, palpitations and leg swelling.   Gastrointestinal: Negative for abdominal pain, blood in stool, constipation, diarrhea, nausea and vomiting.   Endocrine: Negative for polyuria.   Genitourinary: Negative for dysuria.   Musculoskeletal: Positive for arthralgias, gait problem and joint swelling.   Skin: Positive for wound. Negative for rash.   Neurological: Positive for headaches. Negative for tremors, seizures, syncope, speech difficulty, weakness and numbness.   Psychiatric/Behavioral: Negative for confusion.          History  Past Medical History:   Diagnosis Date   • Atrial fibrillation (HCC)    • Hypertension    • Severe malnutrition (HCC) 8/25/2021     No past surgical  history on file.  History reviewed. No pertinent family history.  Social History     Tobacco Use   • Smoking status: Never Smoker   • Smokeless tobacco: Never Used     (Not in a hospital admission)    Allergies:  Penicillins    Objective     Vital Signs  Temp:  [97.5 °F (36.4 °C)] 97.5 °F (36.4 °C)  Heart Rate:  [71-83] 76  Resp:  [16-24] 24  BP: (140-164)/(73-87) 148/80     Physical Exam:      General Appearance:    Alert, cooperative, in no acute distress, oriented x2, strong urine smell in room   Head:     Repaired laceration left forehead, skin tear, evidence of significant bleeding   Eyes:            Lids and lashes normal, conjunctivae and sclerae normal, no   icterus, no pallor, corneas clear, PERRLA   Ears:    Ears appear intact with no abnormalities noted   Throat:   No oral lesions, no thrush, oral mucosa moist   Neck:   No adenopathy, supple, trachea midline, no thyromegaly, no   carotid bruit, no JVD   Lungs:     Clear to auscultation,respirations regular, even and                  unlabored    Heart:    Regular rhythm and normal rate, normal S1 and S2, no            murmur, no gallop, no rub, no click   Chest Wall:    No abnormalities observed   Abdomen:     Normal bowel sounds, no masses, no organomegaly, soft        non-tender, non-distended, no guarding, no rebound                tenderness   Extremities:  Ecchymosis left patella, pain with flexion and extension of left knee   Pulses:   Pulses palpable and equal bilaterally   Skin:   No bleeding, bruising or rash   Lymph nodes:   No palpable adenopathy   Neurologic:   Cranial nerves 2 - 12 grossly intact, sensation intact, DTR       present and equal bilaterally       Results Review:     Imaging Results (Last 24 Hours)     Procedure Component Value Units Date/Time    CT Cervical Spine Without Contrast [[id number]] Collected: 09/07/22 0739     Updated: 09/07/22 0749    Narrative:      DATE OF EXAM:  9/7/2022 6:30 AM     PROCEDURE:  CT CERVICAL SPINE WO  CONTRAST-     INDICATIONS:   trauma. Pain status post fall     COMPARISON:   11/26/2019 and prior     TECHNIQUE:  Routine transaxial slices were obtained through the cervical spine  without the administration of intravenous contrast. Reconstructed  coronal and sagittal images were also obtained. Automated exposure  control and iterative construction methods were used.      FINDINGS:  Exaggerated kyphosis of the thoracic spine incompletely evaluated. The  compression deformity of T3 noted on the previous study is not as  well-visualized on the current study. Mild central loss of height of  multiple vertebral bodies of the lower cervical and upper thoracic spine  appears stable. Diffuse osteopenia limits detection of subtle  abnormalities. Given this limitation no definite acute osseous  abnormality is noted. The prevertebral soft tissues are within normal  limits.     Lateral masses of C1 align normally on C2. The tip of the dens appears  intact. Facet joints appear maintained. Mild diffuse degenerative  changes are noted. Limited imaging lung apices is unremarkable       Impression:      Given the underlying osteopenia and there is no evidence of acute  fracture or subluxation.     Remote T3 compression deformity incompletely evaluated again  appreciated.     Electronically Signed By-Manjinder Ignacio On:9/7/2022 7:47 AM  This report was finalized on [id number] by  Manjinder Ignacio, .    CT Head Without Contrast [[id number]] Collected: 09/07/22 0730     Updated: 09/07/22 0747    Narrative:      CT HEAD WO CONTRAST-     Date of Exam: 9/7/2022 6:30 AM     Indication: trauma.  Headache     Comparison: 8/22/2021 and prior     Technique:  Contiguous axial CT images of the head were obtained without  contrast from skull base to vertex.  Coronal and sagittal  reconstructions were performed.  Automated exposure control and  iterative reconstruction methods were used.       FINDINGS:  Brain/Ventricles: There is high  density along the left convexity of the  brain extending from the frontal to the occipital region and into the  superior aspect of the middle cranial fossa. This measures up to 5 mm in  thickness and has a mixed appearance suggestive of acute and chronic  blood products. No acute hemorrhage is noted within the brain  parenchyma. There is a moderate to advanced degree of diffuse atrophy  noted. Extensive white matter changes compatible small vessel ischemic  disease appreciated which is not unexpected in a patient of this age  group.     Orbits: The visualized portion of the orbits demonstrate no acute  abnormality.     Sinuses:The visualized paranasal sinuses and mastoid air cells  demonstrate no acute abnormality.     Soft Tissues/Skull: Small contusions are noted over the left for head  and scalp and posteriorly over the left parietal vertex. No underlying  acute osseous abnormality noted       Impression:      Subdural hemorrhage with acute and possibly some chronic components. No  associated mass effect or midline shift noted. No acute intracranial  hemorrhage is otherwise noted     Findings were discussed with the provider caring for the patient, VITALY Orr at 7:30 AM at the time of interpretation     Electronically Signed By-Manjinder Ignacio On:9/7/2022 7:39 AM  This report was finalized on [id number] by  Manjinder Ignacio, .    XR Knee 3 View Left [[id number]] Collected: 09/07/22 0700     Updated: 09/07/22 0704    Narrative:      DATE OF EXAM:  9/7/2022 6:50 AM     PROCEDURE:  XR KNEE 3 VW LEFT-     INDICATIONS:  trauma     COMPARISON:  No Comparisons Available     TECHNIQUE:   Three radiologic views of the left knee were obtained.        FINDINGS:  Diffuse osteopenia limits detection of subtle abnormalities. There is a  large joint effusion no displaced fracture is identified. Vascular  calcifications are noted.        Impression:      Diffuse osteopenia limits detection of subtle abnormalities.  No  displaced fracture identified     Large joint effusion noted.     Additional imaging should be obtained as clinically indicated if there  is clinical concern for occult fracture     Electronically Signed By-Manjinder Ignacio On:9/7/2022 7:02 AM  This report was finalized on [id number] by  Manjinder Ignacio, .    XR Hip With or Without Pelvis 2 - 3 View Left [[id number]] Collected: 09/07/22 0656     Updated: 09/07/22 0702    Narrative:      DATE OF EXAM:  9/7/2022 6:49 AM     PROCEDURE:  XR HIP W OR WO PELVIS 2-3 VIEW LEFT-     INDICATIONS:  trauma     COMPARISON:  No Comparisons Available     TECHNIQUE:   Two views of the left hip and one view of the pelvis were obtained.        FINDINGS:  Study is limited by severe diffuse osteopenia.     The bony pelvis appears to be intact. Evaluation of the sacrum is  limited no gross acute abnormality. Remote compression deformities of  lumbar vertebral bodies are similar to prior CTs.     Dedicated imaging of the left hip demonstrates no definite displaced  fracture.     There is diffuse atrophy of the soft tissues. Peripheral vascular  calcifications noted.        Impression:      Severe osteopenia limits detection of subtle abnormalities.     Given limitations of exam no displaced fracture is identified. Follow-up  imaging can be obtained as clinically indicated     Electronically Signed By-Manjinder Ignacio On:9/7/2022 6:59 AM  This report was finalized on [id number] by  Manjinder Ignacio, .           Lab Results (last 24 hours)     ** No results found for the last 24 hours. **           I reviewed the patient's new clinical results.    Assessment & Plan       Atrial fibrillation (HCC)    Severe malnutrition (HCC)    Effusion of left knee    Fall    HTN (hypertension)    Subdural hemorrhage (HCC)  -Observe; neurochecks; no indication for intervention at this time.  Holding all blood thinners.    History of atrial fib -check EKG  Dementia -continue home  medication  Confusion -check urinalysis; UTI suspected  Left knee pain -CT scan to rule out occult fracture    SCDs for DVT prophylaxis  Pepcid for stress ulcer prophylaxis    Patient is DNR/DNI based on previous discussions with patient and family.    I discussed the patient's findings and my recommendations with patient.     Radha Sosa MD  09/07/22  10:16 EDT        Electronically signed by Radha Sosa MD at 09/07/22 2728

## 2022-09-09 VITALS
RESPIRATION RATE: 16 BRPM | WEIGHT: 100.97 LBS | TEMPERATURE: 97.5 F | OXYGEN SATURATION: 93 % | BODY MASS INDEX: 18.58 KG/M2 | HEART RATE: 69 BPM | DIASTOLIC BLOOD PRESSURE: 75 MMHG | SYSTOLIC BLOOD PRESSURE: 101 MMHG | HEIGHT: 62 IN

## 2022-09-09 PROCEDURE — 25010000002 CEFTRIAXONE PER 250 MG: Performed by: NURSE PRACTITIONER

## 2022-09-09 PROCEDURE — G0378 HOSPITAL OBSERVATION PER HR: HCPCS

## 2022-09-09 RX ORDER — CEFDINIR 300 MG/1
300 CAPSULE ORAL 2 TIMES DAILY
Qty: 10 CAPSULE | Refills: 0 | Status: SHIPPED | OUTPATIENT
Start: 2022-09-09 | End: 2022-09-14

## 2022-09-09 RX ORDER — TRAMADOL HYDROCHLORIDE 50 MG/1
50 TABLET ORAL EVERY 6 HOURS PRN
Qty: 16 TABLET | Refills: 0 | Status: SHIPPED | OUTPATIENT
Start: 2022-09-09 | End: 2022-09-14

## 2022-09-09 RX ADMIN — Medication 3 ML: at 08:25

## 2022-09-09 RX ADMIN — BUSPIRONE HYDROCHLORIDE 5 MG: 5 TABLET ORAL at 08:29

## 2022-09-09 RX ADMIN — CEFTRIAXONE 1 G: 1 INJECTION, POWDER, FOR SOLUTION INTRAMUSCULAR; INTRAVENOUS at 10:49

## 2022-09-09 RX ADMIN — RISPERIDONE 0.5 MG: 0.25 TABLET ORAL at 08:29

## 2022-09-09 RX ADMIN — DOCUSATE SODIUM 100 MG: 100 CAPSULE, LIQUID FILLED ORAL at 08:26

## 2022-09-09 RX ADMIN — FAMOTIDINE 40 MG: 20 TABLET, FILM COATED ORAL at 08:25

## 2022-09-09 NOTE — DISCHARGE SUMMARY
Date of Discharge:  9/9/2022    Discharge Diagnosis:   Effusion of left knee [M25.462]   Fall [W19.XXXA]   HTN (hypertension) [I10]   Subdural hemorrhage (HCC) [I62.00]   Severe malnutrition (HCC) [E43]   Atrial fibrillation (HCC) [I48.91]   UTI  Patellar fracture  Patellar effusion    Presenting Problem/History of Present Illness  Active Hospital Problems    Diagnosis  POA   • Effusion of left knee [M25.462]  Yes   • Fall [W19.XXXA]  Yes   • HTN (hypertension) [I10]  Yes   • Subdural hemorrhage (HCC) [I62.00]  Yes   • Severe malnutrition (HCC) [E43]  Yes   • Atrial fibrillation (HCC) [I48.91]  Yes      Resolved Hospital Problems   No resolved problems to display.          Hospital Course  Patient is a 89 y.o. female with paf, dementia, hypertension who presented with  a fall at her facility.  She had left subdural hematoma with no midline shift.  She had left knee effusion, small patellar fracture.  There was no sign of neurologic compromise.  Pain was controlled.  She can bear weight as tolerated, but this may be difficult as her mobility is severely limited..  She is being treated for UTI.  She is under hospice care at the facility and will resume their services.  Focus is on comfort. Aspirin will be held.    Procedures Performed         Consults:   Consults     Date and Time Order Name Status Description    9/7/2022  8:14 AM Hospitalist (on-call MD unless specified)      9/7/2022  7:44 AM Neurosurgery (on-call MD unless specified) Completed           Pertinent Test Results:    Lab Results (most recent)     Procedure Component Value Units Date/Time    Blood Culture - Blood, Arm, Right [[id number]]  (Normal) Collected: 09/08/22 1008    Specimen: Blood from Arm, Right Updated: 09/09/22 1048     Blood Culture No growth at 24 hours    STAT Lactic Acid, Reflex [[id number]]  (Abnormal) Collected: 09/08/22 2017    Specimen: Blood Updated: 09/08/22 2053     Lactate 2.9 mmol/L     STAT Lactic Acid, Reflex [[id number]]   (Abnormal) Collected: 09/08/22 1647    Specimen: Blood Updated: 09/08/22 1843     Lactate 2.6 mmol/L     Blood Culture - Blood, Arm, Right [[id number]] Collected: 09/08/22 1502    Specimen: Blood from Arm, Right Updated: 09/08/22 1543    Urine Culture - Urine, Urine, Random Void [[id number]]  (Abnormal) Collected: 09/07/22 1214    Specimen: Urine, Random Void Updated: 09/08/22 1226     Urine Culture 50,000 CFU/mL Gram Negative Bacilli    Narrative:      Colonization of the urinary tract without infection is common. Treatment is discouraged unless the patient is symptomatic, pregnant, or undergoing an invasive urologic procedure.    Lactic Acid, Plasma [[id number]]  (Abnormal) Collected: 09/08/22 1008    Specimen: Blood Updated: 09/08/22 1054     Lactate 2.1 mmol/L     TSH [[id number]]  (Normal) Collected: 09/07/22 1326    Specimen: Blood from Arm, Left Updated: 09/07/22 1407     TSH 2.470 uIU/mL     Comprehensive Metabolic Panel [[id number]]  (Abnormal) Collected: 09/07/22 1326    Specimen: Blood from Arm, Left Updated: 09/07/22 1401     Glucose 145 mg/dL      BUN 26 mg/dL      Creatinine 0.62 mg/dL      Sodium 137 mmol/L      Potassium 4.4 mmol/L      Chloride 98 mmol/L      CO2 28.0 mmol/L      Calcium 9.3 mg/dL      Total Protein 7.7 g/dL      Albumin 4.00 g/dL      ALT (SGPT) 10 U/L      AST (SGOT) 20 U/L      Alkaline Phosphatase 59 U/L      Total Bilirubin 1.0 mg/dL      Globulin 3.7 gm/dL      A/G Ratio 1.1 g/dL      BUN/Creatinine Ratio 41.9     Anion Gap 11.0 mmol/L      eGFR 81.6 mL/min/1.73      Comment: National Kidney Foundation and American Society of Nephrology (ASN) Task Force recommended calculation based on the Chronic Kidney Disease Epidemiology Collaboration (CKD-EPI) equation refit without adjustment for race.       Narrative:      GFR Normal >60  Chronic Kidney Disease <60  Kidney Failure <15      CBC & Differential [[id number]]  (Abnormal) Collected: 09/07/22 1326    Specimen: Blood from Arm, Left  Updated: 09/07/22 1332    Narrative:      The following orders were created for panel order CBC & Differential.  Procedure                               Abnormality         Status                     ---------                               -----------         ------                     CBC Auto Differential[[id number]]        Abnormal            Final result                 Please view results for these tests on the individual orders.    CBC Auto Differential [[id number]]  (Abnormal) Collected: 09/07/22 1326    Specimen: Blood from Arm, Left Updated: 09/07/22 1332     WBC 9.60 10*3/mm3      RBC 3.67 10*6/mm3      Hemoglobin 11.2 g/dL      Hematocrit 35.5 %      MCV 96.9 fL      MCH 30.6 pg      MCHC 31.6 g/dL      RDW 15.1 %      RDW-SD 50.8 fl      MPV 8.1 fL      Platelets 295 10*3/mm3      Neutrophil % 78.7 %      Lymphocyte % 13.1 %      Monocyte % 7.5 %      Eosinophil % 0.4 %      Basophil % 0.3 %      Neutrophils, Absolute 7.60 10*3/mm3      Lymphocytes, Absolute 1.30 10*3/mm3      Monocytes, Absolute 0.70 10*3/mm3      Eosinophils, Absolute 0.00 10*3/mm3      Basophils, Absolute 0.00 10*3/mm3      nRBC 0.0 /100 WBC     Urinalysis, Microscopic Only - Urine, Random Void [[id number]]  (Abnormal) Collected: 09/07/22 1214    Specimen: Urine, Random Void Updated: 09/07/22 1251     RBC, UA 0-2 /HPF      WBC, UA 31-50 /HPF      Bacteria, UA 3+ /HPF      Squamous Epithelial Cells, UA 0-2 /HPF      Hyaline Casts, UA None Seen /LPF      Methodology Manual Light Microscopy    Urinalysis With Culture If Indicated - Urine, Random Void [[id number]]  (Abnormal) Collected: 09/07/22 1214    Specimen: Urine, Random Void Updated: 09/07/22 1228     Color, UA Yellow     Appearance, UA Cloudy     pH, UA 7.5     Specific Gravity, UA 1.016     Glucose, UA Negative     Ketones, UA Negative     Bilirubin, UA Negative     Blood, UA Small (1+)     Protein, UA Negative     Leuk Esterase, UA Large (3+)     Nitrite, UA Positive      Urobilinogen, UA 0.2 E.U./dL    Narrative:      In absence of clinical symptoms, the presence of pyuria, bacteria, and/or nitrites on the urinalysis result does not correlate with infection.           Results for orders placed during the hospital encounter of 11/26/19    Adult Transthoracic Echo Complete W/ Cont if Necessary Per Protocol    Interpretation Summary  · Mild to moderate tricuspid valve regurgitation is present.  · Estimated EF = 60%.  · Left ventricular systolic function is normal.  · There is mild calcification of the aortic valve.  · Left atrial cavity size is moderately dilated.  · Left ventricular diastolic dysfunction (grade I) consistent with impaired relaxation.  · Mild aortic valve regurgitation is present.  · Mild mitral valve regurgitation is present            Condition on Discharge:  Poor but hemodynamically stable    Vital Signs  Temp:  [97.5 °F (36.4 °C)-98.5 °F (36.9 °C)] 97.5 °F (36.4 °C)  Heart Rate:  [] 69  Resp:  [16-20] 16  BP: ()/(54-82) 101/75    Physical Exam:     General Appearance:    Sleeping, easily awakened   Head:    Normocephalic, without obvious abnormality, atraumatic   Eyes:            Lids and lashes normal, conjunctivae and sclerae normal, no   icterus, no pallor, corneas clear, PERRLA   Ears:    Ears appear intact with no abnormalities noted   Throat:   No oral lesions, no thrush, oral mucosa moist   Neck:   No adenopathy, supple, trachea midline, no thyromegaly, no   carotid bruit, no JVD   Lungs:     Clear to auscultation,respirations regular, even and                  unlabored    Heart:    Irregular   Chest Wall:    No abnormalities observed   Abdomen:     Normal bowel sounds, no masses, no organomegaly, soft        non-tender, non-distended, no guarding, no rebound                tenderness   Extremities:   Moves all extremities well, no edema, no cyanosis, no             redness   Pulses:   Pulses palpable and equal bilaterally   Skin:   No bleeding,  bruising or rash   Lymph nodes:   No palpable adenopathy   Neurologic:   Cranial nerves 2 - 12 grossly intact, sensation intact, DTR       present and equal bilaterally       Discharge Disposition  Skilled Nursing Facility (DC - External)    Discharge Medications     Discharge Medications      New Medications      Instructions Start Date   cefdinir 300 MG capsule  Commonly known as: OMNICEF   300 mg, Oral, 2 Times Daily      traMADol 50 MG tablet  Commonly known as: ULTRAM   50 mg, Oral, Every 6 Hours PRN         Continue These Medications      Instructions Start Date   acetaminophen 325 MG tablet  Commonly known as: TYLENOL   325 mg, Oral, Every 4 Hours PRN      atorvastatin 10 MG tablet  Commonly known as: LIPITOR   10 mg, Oral, Nightly      bisacodyl 10 MG suppository  Commonly known as: DULCOLAX   10 mg, Rectal, Daily PRN      busPIRone 5 MG tablet  Commonly known as: BUSPAR   5 mg, Oral, 2 times daily      docusate sodium 100 MG capsule  Commonly known as: COLACE   100 mg, Oral, Daily      Lidocaine 4 % patch   Apply externally, Daily      metoprolol tartrate 25 MG tablet  Commonly known as: LOPRESSOR   12.5 mg, Oral, Daily      mirtazapine 15 MG tablet  Commonly known as: REMERON   7.5 mg, Oral, Nightly      ondansetron 4 MG tablet  Commonly known as: ZOFRAN   4 mg, Oral, Every 8 Hours PRN         Stop These Medications    aspirin 81 MG chewable tablet            Discharge Diet:  Resume previous diet and nursing orders at facility.    Activity at Discharge: Weight bearing as tolerated - left knee will likely be painful.    Follow-up Appointments  No future appointments.  Additional Instructions for the Follow-ups that You Need to Schedule     Discharge Follow-up with PCP   As directed       Currently Documented PCP:    Floyd Wilson MD    PCP Phone Number:    [phone]     Follow Up Details: Dr. Wilson will resume care at facility               Test Results Pending at Discharge  Pending Labs     Order  Current Status    Blood Culture - Blood, Arm, Right In process    Blood Culture - Blood, Arm, Right Preliminary result    Urine Culture - Urine, Urine, Random Void Preliminary result           Radha Sosa MD  09/09/22  11:59 EDT    Time: Discharge 25 min

## 2022-09-09 NOTE — PLAN OF CARE
Problem: Fall Injury Risk  Goal: Absence of Fall and Fall-Related Injury  Intervention: Identify and Manage Contributors  Recent Flowsheet Documentation  Taken 9/8/2022 2000 by Roger Lucero, RN  Medication Review/Management:   medications reviewed   high-risk medications identified  Intervention: Promote Injury-Free Environment  Recent Flowsheet Documentation  Taken 9/9/2022 0400 by Roger Lucero, RN  Safety Promotion/Fall Prevention:   activity supervised   assistive device/personal items within reach   clutter free environment maintained   fall prevention program maintained   gait belt   nonskid shoes/slippers when out of bed   safety round/check completed   room organization consistent  Taken 9/9/2022 0000 by Roger Lucero, RN  Safety Promotion/Fall Prevention:   activity supervised   assistive device/personal items within reach   clutter free environment maintained   fall prevention program maintained   gait belt   nonskid shoes/slippers when out of bed   room organization consistent   safety round/check completed  Taken 9/8/2022 2200 by Roger Lucero, RN  Safety Promotion/Fall Prevention:   activity supervised   assistive device/personal items within reach   clutter free environment maintained   fall prevention program maintained   gait belt   safety round/check completed   room organization consistent   nonskid shoes/slippers when out of bed  Taken 9/8/2022 2000 by Roger Lucero, RN  Safety Promotion/Fall Prevention:   activity supervised   assistive device/personal items within reach   clutter free environment maintained   gait belt   fall prevention program maintained   safety round/check completed   room organization consistent   nonskid shoes/slippers when out of bed   Goal Outcome Evaluation:      Patient rested well during the night.  Restless at times.  Disoriented to place, time, and situation.  IV rocephin for urinary tract infection.  Afebrile.  Vitals WNL.

## 2022-09-09 NOTE — CASE MANAGEMENT/SOCIAL WORK
Case Management Discharge Note                Selected Continued Care - Admitted Since 9/7/2022     Destination Coordination complete.    Service Provider Selected Services Address Phone Fax Patient Preferred    [facility] [address] [facility] [place] [address] [phone] --           Transportation Services  W/C Van: Skilled Nursing Facility Van    Final Discharge Disposition Code: 04 - intermediate care facility  
Continued Stay Note   Servando     Patient Name: Sierra Arenas  MRN: [id number]  Today's Date: 9/9/2022    Admit Date: 9/7/2022     Discharge Plan     Row Name 09/09/22 1150       Plan    Plan Return to [facility] long term care.No precert/PASRR. [facility] Following. Planned discharge today.  [facility] transport set up for 2:30 today.    Plan Comments Plan for discahrge today.  [facility] transport set up for 2:30 today.  Updated [facility].              Expected Discharge Date and Time     Expected Discharge Date Expected Discharge Time    Sep 9, 2022           Phone communication or documentation only - no physical contact with patient or family.  Leydi Baer RN      Office Phone [phone]  Office Cell [phone]    
Continued Stay Note  DAMIÁN Dialloyd     Patient Name: Sierra Arenas  MRN: [id number]  Today's Date: 9/8/2022    Admit Date: 9/7/2022     Discharge Plan     Row Name 09/08/22 1640       Plan    Plan Return to [facility].No precert/PASRR. [facility] will provide transportation if patient is dc'd 9/8/22  [facility] Following.    Plan [facility]  Added in basket. Barriers to discharge: confusion sp fall at facility.  IV antibiotics, pending cultures.  CT today.  EKG today. Neurosurgery following.              Expected Discharge Date and Time     Expected Discharge Date Expected Discharge Time    Sep 9, 2022           Phone communication or documentation only - no physical contact with patient or family.  Leydi Baer RN      Office Phone [phone]  Office Cell [phone]    
1 = Total assistance

## 2022-09-10 LAB — BACTERIA SPEC AEROBE CULT: ABNORMAL

## 2022-09-13 LAB
BACTERIA SPEC AEROBE CULT: NORMAL
BACTERIA SPEC AEROBE CULT: NORMAL